# Patient Record
Sex: MALE | Race: BLACK OR AFRICAN AMERICAN | NOT HISPANIC OR LATINO | URBAN - METROPOLITAN AREA
[De-identification: names, ages, dates, MRNs, and addresses within clinical notes are randomized per-mention and may not be internally consistent; named-entity substitution may affect disease eponyms.]

---

## 2016-08-08 RX ORDER — INSULIN GLARGINE 100 [IU]/ML
15 INJECTION, SOLUTION SUBCUTANEOUS
Qty: 0 | Refills: 0 | COMMUNITY
Start: 2016-08-08

## 2017-06-23 ENCOUNTER — INPATIENT (INPATIENT)
Facility: HOSPITAL | Age: 57
LOS: 4 days | Discharge: NURSING, REHAB OR LONG-TERM CARE | DRG: 871 | End: 2017-06-28
Attending: INTERNAL MEDICINE | Admitting: INTERNAL MEDICINE
Payer: MEDICAID

## 2017-06-23 VITALS
WEIGHT: 246.04 LBS | OXYGEN SATURATION: 98 % | SYSTOLIC BLOOD PRESSURE: 131 MMHG | DIASTOLIC BLOOD PRESSURE: 66 MMHG | RESPIRATION RATE: 28 BRPM | HEART RATE: 88 BPM

## 2017-06-23 DIAGNOSIS — J96.00 ACUTE RESPIRATORY FAILURE, UNSPECIFIED WHETHER WITH HYPOXIA OR HYPERCAPNIA: ICD-10-CM

## 2017-06-23 DIAGNOSIS — J18.9 PNEUMONIA, UNSPECIFIED ORGANISM: ICD-10-CM

## 2017-06-23 DIAGNOSIS — N17.9 ACUTE KIDNEY FAILURE, UNSPECIFIED: ICD-10-CM

## 2017-06-23 DIAGNOSIS — M86.9 OSTEOMYELITIS, UNSPECIFIED: ICD-10-CM

## 2017-06-23 DIAGNOSIS — E11.49 TYPE 2 DIABETES MELLITUS WITH OTHER DIABETIC NEUROLOGICAL COMPLICATION: ICD-10-CM

## 2017-06-23 DIAGNOSIS — E11.9 TYPE 2 DIABETES MELLITUS WITHOUT COMPLICATIONS: ICD-10-CM

## 2017-06-23 DIAGNOSIS — E11.40 TYPE 2 DIABETES MELLITUS WITH DIABETIC NEUROPATHY, UNSPECIFIED: ICD-10-CM

## 2017-06-23 DIAGNOSIS — E11.621 TYPE 2 DIABETES MELLITUS WITH FOOT ULCER: ICD-10-CM

## 2017-06-23 DIAGNOSIS — J96.90 RESPIRATORY FAILURE, UNSPECIFIED, UNSPECIFIED WHETHER WITH HYPOXIA OR HYPERCAPNIA: ICD-10-CM

## 2017-06-23 DIAGNOSIS — I10 ESSENTIAL (PRIMARY) HYPERTENSION: ICD-10-CM

## 2017-06-23 DIAGNOSIS — G93.40 ENCEPHALOPATHY, UNSPECIFIED: ICD-10-CM

## 2017-06-23 DIAGNOSIS — I82.409 ACUTE EMBOLISM AND THROMBOSIS OF UNSPECIFIED DEEP VEINS OF UNSPECIFIED LOWER EXTREMITY: ICD-10-CM

## 2017-06-23 DIAGNOSIS — R22.0 LOCALIZED SWELLING, MASS AND LUMP, HEAD: ICD-10-CM

## 2017-06-23 DIAGNOSIS — A41.9 SEPSIS, UNSPECIFIED ORGANISM: ICD-10-CM

## 2017-06-23 DIAGNOSIS — Z29.9 ENCOUNTER FOR PROPHYLACTIC MEASURES, UNSPECIFIED: ICD-10-CM

## 2017-06-23 DIAGNOSIS — Z51.5 ENCOUNTER FOR PALLIATIVE CARE: ICD-10-CM

## 2017-06-23 DIAGNOSIS — E66.9 OBESITY, UNSPECIFIED: ICD-10-CM

## 2017-06-23 DIAGNOSIS — C09.9 MALIGNANT NEOPLASM OF TONSIL, UNSPECIFIED: ICD-10-CM

## 2017-06-23 DIAGNOSIS — N19 UNSPECIFIED KIDNEY FAILURE: ICD-10-CM

## 2017-06-23 DIAGNOSIS — G47.33 OBSTRUCTIVE SLEEP APNEA (ADULT) (PEDIATRIC): ICD-10-CM

## 2017-06-23 LAB
ALBUMIN SERPL ELPH-MCNC: 2.7 G/DL — LOW (ref 3.3–5)
ALP SERPL-CCNC: 195 U/L — HIGH (ref 40–120)
ALT FLD-CCNC: 40 U/L — SIGNIFICANT CHANGE UP (ref 12–78)
ANION GAP SERPL CALC-SCNC: 3 MMOL/L — LOW (ref 5–17)
ANION GAP SERPL CALC-SCNC: 6 MMOL/L — SIGNIFICANT CHANGE UP (ref 5–17)
ANISOCYTOSIS BLD QL: SLIGHT — SIGNIFICANT CHANGE UP
APPEARANCE UR: CLEAR — SIGNIFICANT CHANGE UP
AST SERPL-CCNC: 19 U/L — SIGNIFICANT CHANGE UP (ref 15–37)
BASE EXCESS BLDA CALC-SCNC: 3.1 MMOL/L — HIGH (ref -2–2)
BASO STIPL BLD QL SMEAR: PRESENT — SIGNIFICANT CHANGE UP
BILIRUB SERPL-MCNC: 0.3 MG/DL — SIGNIFICANT CHANGE UP (ref 0.2–1.2)
BILIRUB UR-MCNC: NEGATIVE — SIGNIFICANT CHANGE UP
BLOOD GAS COMMENTS ARTERIAL: SIGNIFICANT CHANGE UP
BLOOD GAS COMMENTS ARTERIAL: SIGNIFICANT CHANGE UP
BUN SERPL-MCNC: 100 MG/DL — HIGH (ref 7–23)
BUN SERPL-MCNC: 112 MG/DL — HIGH (ref 7–23)
CALCIUM SERPL-MCNC: 10.1 MG/DL — SIGNIFICANT CHANGE UP (ref 8.5–10.1)
CALCIUM SERPL-MCNC: 9.4 MG/DL — SIGNIFICANT CHANGE UP (ref 8.5–10.1)
CHLORIDE SERPL-SCNC: 104 MMOL/L — SIGNIFICANT CHANGE UP (ref 96–108)
CHLORIDE SERPL-SCNC: 107 MMOL/L — SIGNIFICANT CHANGE UP (ref 96–108)
CO2 SERPL-SCNC: 29 MMOL/L — SIGNIFICANT CHANGE UP (ref 22–31)
CO2 SERPL-SCNC: 35 MMOL/L — HIGH (ref 22–31)
COLOR SPEC: YELLOW — SIGNIFICANT CHANGE UP
COMMENT - URINE: SIGNIFICANT CHANGE UP
CREAT ?TM UR-MCNC: 84 MG/DL — SIGNIFICANT CHANGE UP
CREAT SERPL-MCNC: 3.5 MG/DL — HIGH (ref 0.5–1.3)
CREAT SERPL-MCNC: 3.9 MG/DL — HIGH (ref 0.5–1.3)
DIFF PNL FLD: NEGATIVE — SIGNIFICANT CHANGE UP
GLUCOSE SERPL-MCNC: 338 MG/DL — HIGH (ref 70–99)
GLUCOSE SERPL-MCNC: 338 MG/DL — HIGH (ref 70–99)
GLUCOSE UR QL: 300 MG/DL
HCO3 BLDA-SCNC: 27 MMOL/L — SIGNIFICANT CHANGE UP (ref 23–27)
HCT VFR BLD CALC: 26.2 % — LOW (ref 39–50)
HGB BLD-MCNC: 8.5 G/DL — LOW (ref 13–17)
HOROWITZ INDEX BLDA+IHG-RTO: 40 — SIGNIFICANT CHANGE UP
INR BLD: 1.23 RATIO — HIGH (ref 0.88–1.16)
KETONES UR-MCNC: NEGATIVE — SIGNIFICANT CHANGE UP
LACTATE SERPL-SCNC: 1.1 MMOL/L — SIGNIFICANT CHANGE UP (ref 0.7–2)
LACTATE SERPL-SCNC: 1.3 MMOL/L — SIGNIFICANT CHANGE UP (ref 0.7–2)
LACTATE SERPL-SCNC: 1.6 MMOL/L — SIGNIFICANT CHANGE UP (ref 0.7–2)
LEUKOCYTE ESTERASE UR-ACNC: NEGATIVE — SIGNIFICANT CHANGE UP
LYMPHOCYTES # BLD AUTO: 5 % — LOW (ref 13–44)
MACROCYTES BLD QL: SLIGHT — SIGNIFICANT CHANGE UP
MAGNESIUM SERPL-MCNC: 2.9 MG/DL — HIGH (ref 1.6–2.6)
MCHC RBC-ENTMCNC: 32.6 GM/DL — SIGNIFICANT CHANGE UP (ref 32–36)
MCHC RBC-ENTMCNC: 34.8 PG — HIGH (ref 27–34)
MCV RBC AUTO: 106.7 FL — HIGH (ref 80–100)
MONOCYTES NFR BLD AUTO: 7 % — SIGNIFICANT CHANGE UP (ref 1–9)
NEUTROPHILS NFR BLD AUTO: 80 % — HIGH (ref 43–77)
NEUTS BAND # BLD: 8 % — SIGNIFICANT CHANGE UP (ref 0–8)
NITRITE UR-MCNC: NEGATIVE — SIGNIFICANT CHANGE UP
PCO2 BLDA: 44 MMHG — SIGNIFICANT CHANGE UP (ref 32–46)
PH BLDA: 7.41 — SIGNIFICANT CHANGE UP (ref 7.35–7.45)
PH UR: 5 — SIGNIFICANT CHANGE UP (ref 5–8)
PHOSPHATE SERPL-MCNC: 3.4 MG/DL — SIGNIFICANT CHANGE UP (ref 2.5–4.5)
PLAT MORPH BLD: NORMAL — SIGNIFICANT CHANGE UP
PLATELET # BLD AUTO: 248 K/UL — SIGNIFICANT CHANGE UP (ref 150–400)
PO2 BLDA: 109 MMHG — HIGH (ref 74–108)
POIKILOCYTOSIS BLD QL AUTO: SLIGHT — SIGNIFICANT CHANGE UP
POTASSIUM SERPL-MCNC: 5.4 MMOL/L — HIGH (ref 3.5–5.3)
POTASSIUM SERPL-MCNC: 5.5 MMOL/L — HIGH (ref 3.5–5.3)
POTASSIUM SERPL-SCNC: 5.4 MMOL/L — HIGH (ref 3.5–5.3)
POTASSIUM SERPL-SCNC: 5.5 MMOL/L — HIGH (ref 3.5–5.3)
PROT SERPL-MCNC: 8 G/DL — SIGNIFICANT CHANGE UP (ref 6–8.3)
PROT UR-MCNC: 500 MG/DL
PROTHROM AB SERPL-ACNC: 13.5 SEC — HIGH (ref 9.8–12.7)
RBC # BLD: 2.45 M/UL — LOW (ref 4.2–5.8)
RBC # FLD: 12.1 % — SIGNIFICANT CHANGE UP (ref 10.3–14.5)
RBC BLD AUTO: ABNORMAL
SAO2 % BLDA: 99 % — HIGH (ref 92–96)
SODIUM SERPL-SCNC: 142 MMOL/L — SIGNIFICANT CHANGE UP (ref 135–145)
SODIUM SERPL-SCNC: 142 MMOL/L — SIGNIFICANT CHANGE UP (ref 135–145)
SODIUM UR-SCNC: <20 MMOL/L — SIGNIFICANT CHANGE UP
SP GR SPEC: 1.01 — SIGNIFICANT CHANGE UP (ref 1.01–1.02)
UROBILINOGEN FLD QL: NEGATIVE — SIGNIFICANT CHANGE UP
UUN UR-MCNC: 696 MG/DL — SIGNIFICANT CHANGE UP
WBC # BLD: 24.5 K/UL — HIGH (ref 3.8–10.5)
WBC # FLD AUTO: 24.5 K/UL — HIGH (ref 3.8–10.5)
WBC UR QL: SIGNIFICANT CHANGE UP

## 2017-06-23 PROCEDURE — 71010: CPT | Mod: 26

## 2017-06-23 PROCEDURE — 99285 EMERGENCY DEPT VISIT HI MDM: CPT | Mod: 25

## 2017-06-23 PROCEDURE — 93010 ELECTROCARDIOGRAM REPORT: CPT

## 2017-06-23 PROCEDURE — 99291 CRITICAL CARE FIRST HOUR: CPT

## 2017-06-23 RX ORDER — HEPARIN SODIUM 5000 [USP'U]/ML
5000 INJECTION INTRAVENOUS; SUBCUTANEOUS EVERY 12 HOURS
Qty: 0 | Refills: 0 | Status: DISCONTINUED | OUTPATIENT
Start: 2017-06-23 | End: 2017-06-24

## 2017-06-23 RX ORDER — DEXTROSE 50 % IN WATER 50 %
1 SYRINGE (ML) INTRAVENOUS ONCE
Qty: 0 | Refills: 0 | Status: DISCONTINUED | OUTPATIENT
Start: 2017-06-23 | End: 2017-06-25

## 2017-06-23 RX ORDER — ASPIRIN/CALCIUM CARB/MAGNESIUM 324 MG
81 TABLET ORAL DAILY
Qty: 0 | Refills: 0 | Status: DISCONTINUED | OUTPATIENT
Start: 2017-06-23 | End: 2017-06-28

## 2017-06-23 RX ORDER — DEXTROSE 50 % IN WATER 50 %
25 SYRINGE (ML) INTRAVENOUS ONCE
Qty: 0 | Refills: 0 | Status: DISCONTINUED | OUTPATIENT
Start: 2017-06-23 | End: 2017-06-25

## 2017-06-23 RX ORDER — ACETAMINOPHEN 500 MG
650 TABLET ORAL ONCE
Qty: 0 | Refills: 0 | Status: COMPLETED | OUTPATIENT
Start: 2017-06-23 | End: 2017-06-23

## 2017-06-23 RX ORDER — FINASTERIDE 5 MG/1
5 TABLET, FILM COATED ORAL DAILY
Qty: 0 | Refills: 0 | Status: DISCONTINUED | OUTPATIENT
Start: 2017-06-23 | End: 2017-06-28

## 2017-06-23 RX ORDER — INSULIN GLARGINE 100 [IU]/ML
20 INJECTION, SOLUTION SUBCUTANEOUS AT BEDTIME
Qty: 0 | Refills: 0 | Status: DISCONTINUED | OUTPATIENT
Start: 2017-06-23 | End: 2017-06-25

## 2017-06-23 RX ORDER — VANCOMYCIN HCL 1 G
1000 VIAL (EA) INTRAVENOUS ONCE
Qty: 0 | Refills: 0 | Status: COMPLETED | OUTPATIENT
Start: 2017-06-23 | End: 2017-06-23

## 2017-06-23 RX ORDER — SODIUM CHLORIDE 9 MG/ML
1000 INJECTION, SOLUTION INTRAVENOUS
Qty: 0 | Refills: 0 | Status: DISCONTINUED | OUTPATIENT
Start: 2017-06-23 | End: 2017-06-24

## 2017-06-23 RX ORDER — SODIUM CHLORIDE 9 MG/ML
1000 INJECTION INTRAMUSCULAR; INTRAVENOUS; SUBCUTANEOUS
Qty: 0 | Refills: 0 | Status: COMPLETED | OUTPATIENT
Start: 2017-06-23 | End: 2017-06-23

## 2017-06-23 RX ORDER — GLUCAGON INJECTION, SOLUTION 0.5 MG/.1ML
1 INJECTION, SOLUTION SUBCUTANEOUS ONCE
Qty: 0 | Refills: 0 | Status: DISCONTINUED | OUTPATIENT
Start: 2017-06-23 | End: 2017-06-23

## 2017-06-23 RX ORDER — DEXTROSE 50 % IN WATER 50 %
1 SYRINGE (ML) INTRAVENOUS ONCE
Qty: 0 | Refills: 0 | Status: DISCONTINUED | OUTPATIENT
Start: 2017-06-23 | End: 2017-06-23

## 2017-06-23 RX ORDER — DEXAMETHASONE 0.5 MG/5ML
6 ELIXIR ORAL ONCE
Qty: 0 | Refills: 0 | Status: COMPLETED | OUTPATIENT
Start: 2017-06-23 | End: 2017-06-23

## 2017-06-23 RX ORDER — PIPERACILLIN AND TAZOBACTAM 4; .5 G/20ML; G/20ML
3.38 INJECTION, POWDER, LYOPHILIZED, FOR SOLUTION INTRAVENOUS EVERY 12 HOURS
Qty: 0 | Refills: 0 | Status: DISCONTINUED | OUTPATIENT
Start: 2017-06-23 | End: 2017-06-24

## 2017-06-23 RX ORDER — DEXTROSE 50 % IN WATER 50 %
12.5 SYRINGE (ML) INTRAVENOUS ONCE
Qty: 0 | Refills: 0 | Status: DISCONTINUED | OUTPATIENT
Start: 2017-06-23 | End: 2017-06-23

## 2017-06-23 RX ORDER — METOPROLOL TARTRATE 50 MG
100 TABLET ORAL EVERY 12 HOURS
Qty: 0 | Refills: 0 | Status: DISCONTINUED | OUTPATIENT
Start: 2017-06-23 | End: 2017-06-28

## 2017-06-23 RX ORDER — DEXTROSE 50 % IN WATER 50 %
25 SYRINGE (ML) INTRAVENOUS ONCE
Qty: 0 | Refills: 0 | Status: DISCONTINUED | OUTPATIENT
Start: 2017-06-23 | End: 2017-06-23

## 2017-06-23 RX ORDER — SODIUM POLYSTYRENE SULFONATE 4.1 MEQ/G
30 POWDER, FOR SUSPENSION ORAL ONCE
Qty: 0 | Refills: 0 | Status: COMPLETED | OUTPATIENT
Start: 2017-06-23 | End: 2017-06-23

## 2017-06-23 RX ORDER — ALBUTEROL 90 UG/1
2 AEROSOL, METERED ORAL EVERY 6 HOURS
Qty: 0 | Refills: 0 | Status: DISCONTINUED | OUTPATIENT
Start: 2017-06-23 | End: 2017-06-28

## 2017-06-23 RX ORDER — PANTOPRAZOLE SODIUM 20 MG/1
40 TABLET, DELAYED RELEASE ORAL DAILY
Qty: 0 | Refills: 0 | Status: DISCONTINUED | OUTPATIENT
Start: 2017-06-23 | End: 2017-06-24

## 2017-06-23 RX ORDER — DEXAMETHASONE 0.5 MG/5ML
4 ELIXIR ORAL EVERY 6 HOURS
Qty: 0 | Refills: 0 | Status: DISCONTINUED | OUTPATIENT
Start: 2017-06-23 | End: 2017-06-24

## 2017-06-23 RX ORDER — INSULIN LISPRO 100/ML
VIAL (ML) SUBCUTANEOUS
Qty: 0 | Refills: 0 | Status: DISCONTINUED | OUTPATIENT
Start: 2017-06-23 | End: 2017-06-23

## 2017-06-23 RX ORDER — SODIUM CHLORIDE 9 MG/ML
1000 INJECTION, SOLUTION INTRAVENOUS
Qty: 0 | Refills: 0 | Status: DISCONTINUED | OUTPATIENT
Start: 2017-06-23 | End: 2017-06-23

## 2017-06-23 RX ORDER — SODIUM CHLORIDE 9 MG/ML
1000 INJECTION, SOLUTION INTRAVENOUS
Qty: 0 | Refills: 0 | Status: DISCONTINUED | OUTPATIENT
Start: 2017-06-23 | End: 2017-06-25

## 2017-06-23 RX ORDER — DEXTROSE 50 % IN WATER 50 %
12.5 SYRINGE (ML) INTRAVENOUS ONCE
Qty: 0 | Refills: 0 | Status: DISCONTINUED | OUTPATIENT
Start: 2017-06-23 | End: 2017-06-25

## 2017-06-23 RX ORDER — PIPERACILLIN AND TAZOBACTAM 4; .5 G/20ML; G/20ML
3.38 INJECTION, POWDER, LYOPHILIZED, FOR SOLUTION INTRAVENOUS ONCE
Qty: 0 | Refills: 0 | Status: COMPLETED | OUTPATIENT
Start: 2017-06-23 | End: 2017-06-23

## 2017-06-23 RX ORDER — IPRATROPIUM/ALBUTEROL SULFATE 18-103MCG
3 AEROSOL WITH ADAPTER (GRAM) INHALATION ONCE
Qty: 0 | Refills: 0 | Status: COMPLETED | OUTPATIENT
Start: 2017-06-23 | End: 2017-06-23

## 2017-06-23 RX ORDER — GLUCAGON INJECTION, SOLUTION 0.5 MG/.1ML
1 INJECTION, SOLUTION SUBCUTANEOUS ONCE
Qty: 0 | Refills: 0 | Status: DISCONTINUED | OUTPATIENT
Start: 2017-06-23 | End: 2017-06-25

## 2017-06-23 RX ORDER — INSULIN LISPRO 100/ML
VIAL (ML) SUBCUTANEOUS EVERY 4 HOURS
Qty: 0 | Refills: 0 | Status: DISCONTINUED | OUTPATIENT
Start: 2017-06-23 | End: 2017-06-25

## 2017-06-23 RX ADMIN — SODIUM POLYSTYRENE SULFONATE 30 GRAM(S): 4.1 POWDER, FOR SUSPENSION ORAL at 03:37

## 2017-06-23 RX ADMIN — INSULIN GLARGINE 20 UNIT(S): 100 INJECTION, SOLUTION SUBCUTANEOUS at 21:19

## 2017-06-23 RX ADMIN — Medication 8: at 21:19

## 2017-06-23 RX ADMIN — SODIUM CHLORIDE 1000 MILLILITER(S): 9 INJECTION INTRAMUSCULAR; INTRAVENOUS; SUBCUTANEOUS at 02:40

## 2017-06-23 RX ADMIN — HEPARIN SODIUM 5000 UNIT(S): 5000 INJECTION INTRAVENOUS; SUBCUTANEOUS at 17:59

## 2017-06-23 RX ADMIN — SODIUM CHLORIDE 1000 MILLILITER(S): 9 INJECTION INTRAMUSCULAR; INTRAVENOUS; SUBCUTANEOUS at 01:54

## 2017-06-23 RX ADMIN — Medication 3 MILLILITER(S): at 03:42

## 2017-06-23 RX ADMIN — Medication 6 MILLIGRAM(S): at 09:21

## 2017-06-23 RX ADMIN — Medication 10: at 12:12

## 2017-06-23 RX ADMIN — Medication 4 MILLIGRAM(S): at 23:09

## 2017-06-23 RX ADMIN — Medication 250 MILLIGRAM(S): at 03:25

## 2017-06-23 RX ADMIN — SODIUM CHLORIDE 100 MILLILITER(S): 9 INJECTION, SOLUTION INTRAVENOUS at 21:24

## 2017-06-23 RX ADMIN — Medication 4 MILLIGRAM(S): at 17:59

## 2017-06-23 RX ADMIN — Medication 650 MILLIGRAM(S): at 02:00

## 2017-06-23 RX ADMIN — FINASTERIDE 5 MILLIGRAM(S): 5 TABLET, FILM COATED ORAL at 17:59

## 2017-06-23 RX ADMIN — PIPERACILLIN AND TAZOBACTAM 25 GRAM(S): 4; .5 INJECTION, POWDER, LYOPHILIZED, FOR SOLUTION INTRAVENOUS at 17:59

## 2017-06-23 RX ADMIN — Medication 81 MILLIGRAM(S): at 12:13

## 2017-06-23 RX ADMIN — Medication 8: at 17:58

## 2017-06-23 RX ADMIN — PANTOPRAZOLE SODIUM 40 MILLIGRAM(S): 20 TABLET, DELAYED RELEASE ORAL at 18:05

## 2017-06-23 RX ADMIN — PIPERACILLIN AND TAZOBACTAM 200 GRAM(S): 4; .5 INJECTION, POWDER, LYOPHILIZED, FOR SOLUTION INTRAVENOUS at 02:00

## 2017-06-23 NOTE — PROGRESS NOTE ADULT - SUBJECTIVE AND OBJECTIVE BOX
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 Upon initial evaluation, noted to have leukocytosis, worsening renal function with hyperkalemia, and CXR concerning for pneumonia, admitted for sepsis workup. Also with audible upper airway sounds, concerning for upper airway obstruction. Per HCP, pt has had decline in mental status over the past 1-2 weeks. In 2017, had negative noncontrast CT head, CT neck revealed recurrence of CA.     Events last 24 hours: Remains on BiPAP. Eyes open, withdraws UE to noxious stimuli, nonverbal and does not follow commands. EGD revealed severe edema of epiglottis, upper airway, and larynx. Subglottic region and trachea patent. Case discussed with ID- recommends to continue Zosyn for possible aspiration pna at this time. DNR / DNI.    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      Medications:  piperacillin/tazobactam IVPB. 3.375Gram(s) IV Intermittent every 12 hours  metoprolol 100milliGRAM(s) Oral every 12 hours  ALBUTerol    90 MICROgram(s) HFA Inhaler 2Puff(s) Inhalation every 6 hours PRN  heparin  Injectable 5000Unit(s) SubCutaneous every 12 hours  aspirin enteric coated 81milliGRAM(s) Oral daily  pantoprazole  Injectable 40milliGRAM(s) IV Push daily  finasteride 5milliGRAM(s) Oral daily  dexamethasone  Injectable 4milliGRAM(s) IV Push every 6 hours  insulin glargine Injectable (LANTUS) 20Unit(s) SubCutaneous at bedtime  insulin lispro (HumaLOG) corrective regimen sliding scale  SubCutaneous every 4 hours  dextrose Gel 1Dose(s) Oral once PRN  dextrose 50% Injectable 12.5Gram(s) IV Push once  dextrose 50% Injectable 25Gram(s) IV Push once  dextrose 50% Injectable 25Gram(s) IV Push once  glucagon  Injectable 1milliGRAM(s) IntraMuscular once PRN  sodium chloride 0.45%. 1000milliLiter(s) IV Continuous <Continuous>  dextrose 5%. 1000milliLiter(s) IV Continuous <Continuous>      ICU Vital Signs Last 24 Hrs  T(C): 37.2, Max: 37.9 ( @ 05:36)  T(F): 99, Max: 100.2 ( @ 05:36)  HR: 96 (86 - 106)  BP: 149/75 (101/57 - 198/93)  BP(mean): 105 (75 - 133)  ABP: --  ABP(mean): --  RR: 14 (14 - 30)  SpO2: 100% (90% - 100%)      ABG - ( 2017 12:43 )  pH: 7.41  /  pCO2: 44    /  pO2: 109   / HCO3: 27    / Base Excess: 3.1   /  SaO2: 99        I&O's Detail    I & Os for current day (as of 2017 22:19)  =============================================  IN:    sodium chloride 0.45%.: 1200 ml    Oral Fluid: 120 ml    Solution: 100 ml    Total IN: 1420 ml  ---------------------------------------------  OUT:    Indwelling Catheter - Urethral: 1160 ml    Total OUT: 1160 ml  ---------------------------------------------  Total NET: 260 ml        LABS:                        8.0    25.0  )-----------( 197      ( 2017 15:51 )             24.4         142  |  107  |  100<H>  ----------------------------<  338<H>  5.4<H>   |  29  |  3.50<H>    Ca    9.4      2017 13:57  Phos  3.4       Mg     2.9         TPro  8.0  /  Alb  2.7<L>  /  TBili  0.3  /  DBili  x   /  AST  19  /  ALT  40  /  AlkPhos  195<H>        CAPILLARY BLOOD GLUCOSE  319 (2017 22:00)    PT/INR - ( 2017 01:41 )   PT: 13.5 sec;   INR: 1.23 ratio        Urinalysis Basic - ( 2017 01:56 )    Color: Yellow / Appearance: Clear / S.015 / pH: x  Gluc: x / Ketone: Negative  / Bili: Negative / Urobili: Negative   Blood: x / Protein: 500 mg/dL / Nitrite: Negative   Leuk Esterase: Negative / RBC: x / WBC 0-2   Sq Epi: x / Non Sq Epi: x / Bacteria: x      CULTURES:      Physical Examination:    General: Obese AA male, lying in bed awake. No acute distress.      HEENT: Eyes open, pupils equal, reactive to light and symmetric.    PULM: On BiPAP. No accessory muscle use or respiratory distress. Course breath sounds throughout bilaterally, no significant sputum production    CVS: Regular rate and rhythm, +S1,S2, no murmurs, rubs, or gallops    ABD: Soft, nondistended, normoactive bowel sounds, no masses    EXT: Chronic skin and nail changes    SKIN: Warm, no rashes noted.    NEURO: Awake, no purposeful movements, does not follow commands, nonverbal    RADIOLOGY: EXAM:  PORTABLE CHEST URGENT                          PROCEDURE DATE:  2017      INTERPRETATION:  EXAM: Portable chest x-ray.    CLINICAL INFORMATION: Fever.    FINDINGS: Artifacts limit the exam. Left hemidiaphragm is obscured and   there is some increased density noted in the retrocardiac region,   concerning for left lower lobe infiltrate. Clinical correlation is   advised. Remainder of the study appears similar to prior exam of 2016.    IMPRESSION: Findings as above.    VERONIKA MOORE M.D., ATTENDING RADIOLOGIST  This document has been electronically signed. 2017 10:14AM    CRITICAL CARE TIME SPENT: I have spent 38 minutes of critical care time evaluating and treating this patient, including reviewing charts, labs, imagining studies, and collaborating with interdisciplinary team. HCP made aware of findings by ENT. Discussed goals of care with HCP and addressed concerns for worsening of mental status and respiratory status; given degree of swelling obstructing the airway that may preclude ET intubation, pt likely may ultimately require trach. HCP verbalized that patient made wishes clear that he does not want tracheostomy, and HCP has agreed to DNR/DNI.

## 2017-06-23 NOTE — CONSULT NOTE ADULT - PROBLEM SELECTOR PROBLEM 1
CARMEL (acute kidney injury)
Encephalopathy, unspecified
Pneumonia due to infectious organism, unspecified laterality, unspecified part of lung

## 2017-06-23 NOTE — PROGRESS NOTE ADULT - PROBLEM SELECTOR PLAN 7
Unable to take PO meds at this time given his current mental status  Will treat with IV push meds as needed for now Continue metoprolol

## 2017-06-23 NOTE — PATIENT PROFILE ADULT. - PRO SERVICES AT [facility]
Airway  Urgency: elective    Date/Time: 3/8/2017 3:09 PM  Airway not difficult    General Information and Staff    Patient location during procedure: OR  CRNA: KEVIN ABBOTT    Indications and Patient Condition  Indications for airway management: airway protection    Preoxygenated: yes  Mask difficulty assessment: 1 - vent by mask    Final Airway Details  Final airway type: supraglottic airway      Successful airway: classic  Size 4    Number of attempts at approach: 1    Additional Comments  LMA placed without difficulty, ventilation with assist, equal breath sounds and symmetric chest rise and fall             none

## 2017-06-23 NOTE — H&P ADULT - PROBLEM SELECTOR PLAN 2
Admitted for septic workup and evaluation,send blood and urine cx,serial lactate levels,monitor vitals closley,ivfs hydration,monitor urine output and renal profile,iv abx initiated

## 2017-06-23 NOTE — CONSULT NOTE ADULT - PROBLEM SELECTOR RECOMMENDATION 3
ON COVERAGE MEDS
DIABETES:  not Good control, continue current therapy.  [Control is    poor ].   Encourage weight loss.  [ Regular exercise program. ] [ Referral for dietary counseling. ]

## 2017-06-23 NOTE — CONSULT NOTE ADULT - SUBJECTIVE AND OBJECTIVE BOX
Patient unknown to me with hx of tonsil carcinoma. Patient unable to give hx. hx obtained from care giver and health care proxy. In 2016 patient a smoker waying over 400 pounds was diagnosed with tonsil cancer on right. No surgery. Treated with radiation and chemotherapy. Unable to complete radiation secondary to complications. Was told might need tracheostomy and patient refused at that time. Subsequently admitted to nursing home. Hx of diabetes. weight loss from over 400 to 250. Had several possible neurological episodes while undergoing pt in nursing home. Insulin stopped and blood sugar found to be stable at 150. Had intermittent mental status changes and confusion and in May of 2017 approx 5 weeks ago underwent ct scan of brain and neck. Head ct normal. Neck ct diffuse swelling with obvious tumor reoccurrence of significant lymph nodes. was given steroids for difficulty breathing and a swelling appeared right neck. Has never had PET scan or MRI. Last 2 days unresponsive to anything but pain. Having respiratory difficulty and was placed on cpap which seems to be helping here in ICU.. Had hx of sleep apnea but was not given cpap in nursing home. was given oxygen instead. blood sugar in hospital approx 400. Insulin restarted    On physical exam patient unresponsive, on cpap. Nasal clear. Oral filled with thick secretions. suctioned clear and oral cavity examined. no obvious tonsil cancer or localized disease. significant edema and soft tissue swelling noted. no distinct lesion. Flex laryngoscopy through left nares reveals severe edema of upper airway and larynx. subglottic region and trachea patent.  Pooling noted post cricoid, pyriforms and valleculae. epiglottis edematous. unable to assess cord motion but appears to be open as able to pass flexible scope through cords to trachea.   CPAP reapplied and oral care given by nurses after examination.

## 2017-06-23 NOTE — PROGRESS NOTE ADULT - PROBLEM SELECTOR PLAN 1
Continue NIV  Repeat ABG in AM  Will titrate settings to maintain SaO2 >92%, or pH >7.25.   Aggressive chest PT and suctioning  Continue nebs, decadron  Aspiration precautions, HOB >30 degrees  NPO

## 2017-06-23 NOTE — CONSULT NOTE ADULT - SUBJECTIVE AND OBJECTIVE BOX
Patient is a 57y old  Male who presents with a chief complaint of fever,sob (23 Jun 2017 07:52)      HPI:  56 yo AAM ,[facility] resident with hx of DM,OBESITY,JYOTI,HTN,OM,DVT,R heel ulcer,PVD,R tonsillar ca ,brought to ER with fever 101.8 and sob ,he  was diagnosed with acute respiratory failure and found to have stridor and upper airway obstuction ,admitted to ICU ,placed on BIPAP and ENT eval requested 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 He did not undergo any surgery as he was a poor candidate. Thereafter, he was sent to a rehab facility where his status worsened overtime,he became LTC resident and was refusing tracheostomy which was offered few times  For the last few months, his mental and functional status has deteriorated He lost 150 lbs( his weight was 400 lbs when he was diagnosed with ca)  He has not been very responsive for about 10 days . He had a CT neck in May 2017 which showed severe airway obstruction. Patient was told he  needs a tracheostomy since he was diagnosed with SCC last August  He was started on augmentin recently by Dr Kay due to prorbale pneumonia and transferred to ER this morning due to fever and dyspnea Patient is not able to provide any hx in ER due to unresposive state and respiratory distress Admitted to ICU Palliative care consult requested ,to discuss advance directives and complete MOLST ,HCP requests everyhtning to be done ,but aware of patient's wishes not to have tracheostomy and in agreement with his decision Patient also found to be in ARF and to be hyperglycemic (23 Jun 2017 07:52)      Asked to see patient for ID Consult    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  Osteomyelitis of ankle or foot  Diabetic retinopathy associated with type 2 diabetes mellitus: Insulin  Lumbago  Neuropathy in diabetes  Tobacco dependence  Cellulitis  JYOTI on CPAP  Hypertension  Obesity  DVT (deep venous thrombosis)  Diabetes mellitus  No significant past surgical history      Allergies    No Known Allergies    Intolerances        REVIEW OF SYSTEMS:  All systems below were reviewed and are negative [  ]  HEENT:  ID:  Pulmonary:  Cardiac:  GI:  Renal:  Musculoskeletal:  All other systems above were reviewed and are negative   [  ]    HOME MEDICATIONS:    MEDICATIONS  (STANDING):  heparin  Injectable 5000Unit(s) SubCutaneous every 12 hours  piperacillin/tazobactam IVPB. 3.375Gram(s) IV Intermittent every 12 hours  sodium chloride 0.45%. 1000milliLiter(s) IV Continuous <Continuous>  finasteride 5milliGRAM(s) Oral daily  aspirin enteric coated 81milliGRAM(s) Oral daily  metoprolol 100milliGRAM(s) Oral every 12 hours  dexamethasone  Injectable 4milliGRAM(s) IV Push every 6 hours  insulin glargine Injectable (LANTUS) 20Unit(s) SubCutaneous at bedtime  insulin lispro (HumaLOG) corrective regimen sliding scale  SubCutaneous every 4 hours  dextrose 5%. 1000milliLiter(s) IV Continuous <Continuous>  dextrose 50% Injectable 12.5Gram(s) IV Push once  dextrose 50% Injectable 25Gram(s) IV Push once  dextrose 50% Injectable 25Gram(s) IV Push once  pantoprazole  Injectable 40milliGRAM(s) IV Push daily    MEDICATIONS  (PRN):  ALBUTerol    90 MICROgram(s) HFA Inhaler 2Puff(s) Inhalation every 6 hours PRN Shortness of Breath and/or Wheezing  dextrose Gel 1Dose(s) Oral once PRN Blood Glucose LESS THAN 70 milliGRAM(s)/deciliter  glucagon  Injectable 1milliGRAM(s) IntraMuscular once PRN Glucose LESS THAN 70 milligrams/deciliter      Vital Signs Last 24 Hrs  T(C): 37.2, Max: 37.9 (06-23 @ 05:36)  T(F): 99, Max: 100.2 (06-23 @ 05:36)  HR: 86 (86 - 106)  BP: 132/71 (101/57 - 198/93)  BP(mean): 95 (75 - 133)  RR: 14 (14 - 30)  SpO2: 100% (90% - 100%)    PHYSICAL EXAM:  HEENT:  Neck:  Lungs:  Heart:  Abdomen:  Genital/ Rectal:  Extremities:  Neurologic:  Vascular:    I&O's Summary    I & Os for current day (as of 23 Jun 2017 21:51)  =============================================  IN: 1420 ml / OUT: 1030 ml / NET: 390 ml      LABORATORY:                          8.0    25.0  )-----------( 197      ( 23 Jun 2017 15:51 )             24.4           06-23    142  |  107  |  100<H>  ----------------------------<  338<H>  5.4<H>   |  29  |  3.50<H>    Ca    9.4      23 Jun 2017 13:57  Phos  3.4     06-23  Mg     2.9     06-23    TPro  8.0  /  Alb  2.7<L>  /  TBili  0.3  /  DBili  x   /  AST  19  /  ALT  40  /  AlkPhos  195<H>  06-23          LABORATORY:    CBC Full  -  ( 23 Jun 2017 15:51 )  WBC Count : 25.0 K/uL  Hemoglobin : 8.0 g/dL  Hematocrit : 24.4 %  Platelet Count - Automated : 197 K/uL  Mean Cell Volume : 106.8 fl  Mean Cell Hemoglobin : 34.9 pg  Mean Cell Hemoglobin Concentration : 32.7 gm/dL  Auto Neutrophil # : x  Auto Lymphocyte # : x  Auto Monocyte # : x  Auto Eosinophil # : x  Auto Basophil # : x  Auto Neutrophil % : 87.0 %  Auto Lymphocyte % : 7.0 %  Auto Monocyte % : 2.0 %  Auto Eosinophil % : x  Auto Basophil % : x          06-23    142  |  107  |  100<H>  ----------------------------<  338<H>  5.4<H>   |  29  |  3.50<H>    Ca    9.4      23 Jun 2017 13:57  Phos  3.4     06-23  Mg     2.9     06-23    TPro  8.0  /  Alb  2.7<L>  /  TBili  0.3  /  DBili  x   /  AST  19  /  ALT  40  /  AlkPhos  195<H>  06-23 The patient is a 57 year old male with a history of SCC of the right tonsil with metastasis to the R neck treated with chemotherapy and radiation who was brought to the ED for acute respiratory failure and found to have stridor and upper airway obstruction. The patient was admitted to ICU and placed on BIBAP. He was on Augmentin at the rehab recently for pneumonia. The patient was advised to have tracheostomy done in the past due to the increased swelling in his neck but he refused. He was given one dose of IV Vancomycin and is on IV Zosyn now.        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    HOME MEDICATIONS:    MEDICATIONS  (STANDING):  heparin  Injectable 5000Unit(s) SubCutaneous every 12 hours  piperacillin/tazobactam IVPB. 3.375Gram(s) IV Intermittent every 12 hours  sodium chloride 0.45%. 1000milliLiter(s) IV Continuous <Continuous>  finasteride 5milliGRAM(s) Oral daily  aspirin enteric coated 81milliGRAM(s) Oral daily  metoprolol 100milliGRAM(s) Oral every 12 hours  dexamethasone  Injectable 4milliGRAM(s) IV Push every 6 hours  insulin glargine Injectable (LANTUS) 20Unit(s) SubCutaneous at bedtime  insulin lispro (HumaLOG) corrective regimen sliding scale  SubCutaneous every 4 hours  dextrose 5%. 1000milliLiter(s) IV Continuous <Continuous>  dextrose 50% Injectable 12.5Gram(s) IV Push once  dextrose 50% Injectable 25Gram(s) IV Push once  dextrose 50% Injectable 25Gram(s) IV Push once  pantoprazole  Injectable 40milliGRAM(s) IV Push daily    MEDICATIONS  (PRN):  ALBUTerol    90 MICROgram(s) HFA Inhaler 2Puff(s) Inhalation every 6 hours PRN Shortness of Breath and/or Wheezing  dextrose Gel 1Dose(s) Oral once PRN Blood Glucose LESS THAN 70 milliGRAM(s)/deciliter  glucagon  Injectable 1milliGRAM(s) IntraMuscular once PRN Glucose LESS THAN 70 milligrams/deciliter      Vital Signs Last 24 Hrs  T(C): 37.2, Max: 37.9 (06-23 @ 05:36)  T(F): 99, Max: 100.2 (06-23 @ 05:36)  HR: 86 (86 - 106)  BP: 132/71 (101/57 - 198/93)  BP(mean): 95 (75 - 133)  RR: 14 (14 - 30)  SpO2: 100% (90% - 100%)    PHYSICAL EXAM:      HEENT: On BIBAP.  Neck: Large swelling.  Lungs: Coarse BS bilaterally; no wheezing.  Heart: RRR; no murmurs.   Abdomen: Soft; no masses.   Extremities: No ulcers.    I&O's Summary    I & Os for current day (as of 23 Jun 2017 21:51)  =============================================  IN: 1420 ml / OUT: 1030 ml / NET: 390 ml      LABORATORY:                          8.0    25.0  )-----------( 197      ( 23 Jun 2017 15:51 )             24.4           06-23    142  |  107  |  100<H>  ----------------------------<  338<H>  5.4<H>   |  29  |  3.50<H>    Ca    9.4      23 Jun 2017 13:57  Phos  3.4     06-23  Mg     2.9     06-23    TPro  8.0  /  Alb  2.7<L>  /  TBili  0.3  /  DBili  x   /  AST  19  /  ALT  40  /  AlkPhos  195<H>  06-23          LABORATORY:    CBC Full  -  ( 23 Jun 2017 15:51 )  WBC Count : 25.0 K/uL  Hemoglobin : 8.0 g/dL  Hematocrit : 24.4 %  Platelet Count - Automated : 197 K/uL  Mean Cell Volume : 106.8 fl  Mean Cell Hemoglobin : 34.9 pg  Mean Cell Hemoglobin Concentration : 32.7 gm/dL  Auto Neutrophil # : x  Auto Lymphocyte # : x  Auto Monocyte # : x  Auto Eosinophil # : x  Auto Basophil # : x  Auto Neutrophil % : 87.0 %  Auto Lymphocyte % : 7.0 %  Auto Monocyte % : 2.0 %  Auto Eosinophil % : x  Auto Basophil % : x          06-23    142  |  107  |  100<H>  ----------------------------<  338<H>  5.4<H>   |  29  |  3.50<H>    Ca    9.4      23 Jun 2017 13:57  Phos  3.4     06-23  Mg     2.9     06-23    TPro  8.0  /  Alb  2.7<L>  /  TBili  0.3  /  DBili  x   /  AST  19  /  ALT  40  /  AlkPhos  195<H>  06-23      Assessment and Plan:    1. Respiratory failure.  2. Suspected aspiration pneumonia.  3. SCC of R tonsil with metastasis to R neck.    . Agree with IV Zosyn for suspected aspiration pneumonia.  . ENT evaluation for neck mass and airway obstruction.  . Continue BIBAP.  . Supportive care.      Thank you for the courtesy of this consultation.

## 2017-06-23 NOTE — ED ADULT NURSE REASSESSMENT NOTE - NS ED NURSE REASSESS COMMENT FT1
patient seen and evaluated by ICu attending. Placed on CPAP. Upgraded to ICU  RN Evelyn linares
patient barely responding, oral mucosa dry, Dr. Martinez made aware, humidified O2 started,   Dr Hanley at bedside , ABG performed as ordered; to be evaluated by ICU

## 2017-06-23 NOTE — PROGRESS NOTE ADULT - ASSESSMENT
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 Also noted to have worsening CARMEL on CKD with uptrending BUN/Cr noted on outpatient labs over past several months, with hyperkalemia and hyperglycemia. Etiology of progressive encephalopathy unclear at this time, brain mets vs metabolic causes considered in differential.

## 2017-06-23 NOTE — CONSULT NOTE ADULT - SUBJECTIVE AND OBJECTIVE BOX
Patient seen and examined today Plan discussed/Questions answered  (with patient/ancillary staff/colleagues) Chart notes reviewd More detailed Pulm/Critical Care notes, assessment and plan  will be added later today as needed after reviewing further labs as they become available     Notable interval events reviewed    ROS/PE  REVIEW OF SYSTEMS Patient is unable to give any reliable review of systems   PHYSICAL EXAM  thick neck Uable to speak Has inspiratory stridor   HEENT Unremarkable PERRLA atraumatic  RESP Fair air entry EXP prolonged    Harsh breath sound Resp distres mild  CARDIAC S1 S2 No S3     NO JVD   Awake Alert and ORIENTED x on  ABDOMEN SOFT BS PRESENT NOT DISTENDED No hepatosplenomegaly  PEDAL EDEMA present No calf tenderness  NO rash GENERAL Not TOXIC looking  . Patient seen and examined today Plan discussed/Questions answered  (with patient/ancillary staff/colleagues) Chart notes reviewd More detailed Pulm/Critical Care notes, assessment and plan  will be added later today as needed after reviewing further labs as they become available     Notable interval events reviewed HCP Erika   57 m ho throat cancer transferred early am 6/23 from [facility] with fever T 101 resp distress In ER noted to have insp stridor ABG on 100% (9a) 734/54/92  [facility] Albuterol neb norvasc 5 asa 81 cyclobenzaprine 5.2 doxazosin 5 lexapro 10 Finasteride 5 Lasix 40 hydralazine 75.3 metoprolol 50.2 renvela .8x3 senna spironolactone 25 miralax D3 2000 6/23/2017 W 24.5 Hb 8.5 Plt 248  Na 142 K 5.5 CO2 35 Cr 3.9 G 338 Alb 2.7  AST 19 ALT 40   6/23LA 1.6-1.1  6/22/2017 CXR lll infiltr     ROS/PE  REVIEW OF SYSTEMS Patient is unable to give any reliable review of systems   PHYSICAL EXAM  thick neck Uable to speak Has inspiratory stridor   HEENT Unremarkable PERRLA atraumatic  RESP Fair air entry EXP prolonged    Harsh breath sound Resp distres mild  CARDIAC S1 S2 No S3     NO JVD   Awake Alert and ORIENTED x on  ABDOMEN SOFT BS PRESENT NOT DISTENDED No hepatosplenomegaly  PEDAL EDEMA present No calf tenderness  NO rash GENERAL Not TOXIC looking  ASSESSMENT PLAN   Case dw Dr Finley 6/23 930 a Pt will be admitted to ICU   HYPERGLYCEMIA  CARMEL   UAO  RESP     . Patient seen and examined today Plan discussed/Questions answered  (with patient/ancillary staff/colleagues) Chart notes reviewd More detailed Pulm/Critical Care notes, assessment and plan  will be added later today as needed after reviewing further labs as they become available     Notable interval events reviewed HCP Erika   57 m ho throat cancer transferred early am 6/23 from [facility] with fever T 101 resp distress In ER noted to have insp stridor ABG on 100% (9a) 734/54/92  [facility] Albuterol neb norvasc 5 asa 81 cyclobenzaprine 5.2 doxazosin 5 lexapro 10 Finasteride 5 Lasix 40 hydralazine 75.3 metoprolol 50.2 renvela .8x3 senna spironolactone 25 miralax D3 2000 6/23/2017 W 24.5 Hb 8.5 Plt 248  Na 142 K 5.5 CO2 35 Cr 3.9 G 338 Alb 2.7  AST 19 ALT 40   6/23LA 1.6-1.1  6/22/2017 CXR lll infiltr     ROS/PE  REVIEW OF SYSTEMS Patient is unable to give any reliable review of systems   PHYSICAL EXAM  thick neck Uable to speak Has inspiratory stridor   HEENT Unremarkable PERRLA atraumatic  RESP Fair air entry EXP prolonged    Harsh breath sound Resp distres mild  CARDIAC S1 S2 No S3     NO JVD   Awake Alert and ORIENTED x on  ABDOMEN SOFT BS PRESENT NOT DISTENDED No hepatosplenomegaly  PEDAL EDEMA present No calf tenderness  NO rash GENERAL Not TOXIC looking  ASSESSMENT PLAN   Case dw Dr Finley 6/23 930 a Pt will be admitted to ICU     . 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  (His Hgb baseline is about 9) lymphedema chronic right heel ulcer (chronic right foot ulcer with chronic drainage tx by Dr Pham in the wound care center), HTN (In 8/2016 His TTE showed LVEF 60%. Grossly normal right ventricular size and function. Moderate LAE), JYOTI (on CPAP  14), PVD (8/2016 US arterial and venous doppler of his bilateral lower extremities which showed significant stenosis between his superficial femoral and popliteal arteries bilaterally but no evidence of DVTs.) smoker who presents from [facility] with resp distress inspiratory stridor and was admitted to [facility] ICU 6/23/2017  [facility] Albuterol neb norvasc 5 asa 81 cyclobenzaprine 5.2 doxazosin 5 lexapro 10 Finasteride 5 Lasix 40 hydralazine 75.3 metoprolol 50.2 renvela .8x3 senna spironolactone 25 miralax D3 2000   Past Medical History:  Cellulitis    Diabetes mellitus    Diabetic retinopathy associated with type 2 diabetes mellitus  Insulin  DVT (deep venous thrombosis)    Hypertension    Lumbago    Neuropathy in diabetes    Obesity    JYOTI on CPAP    Osteomyelitis of ankle or foot    Tobacco dependence.  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  JYOTI (on CPAP  14), PVD smoker who presented from [facility] with resp distress and fever T 101 In ER noted to have insp stridor ABG on 100% (9a) 734/54/92 and was admitted to [facility] ICU 6/23/2017  VITALS/LABS  6/23/2017 W 24.5 Hb 8.5 Plt 248  Na 142 K 5.5 CO2 35 Cr 3.9 G 338 Alb 2.7  AST 19 ALT 40   6/23 LA 1.6-1.1  6/22/2017 CXR lll infiltr

## 2017-06-23 NOTE — CONSULT NOTE ADULT - SUBJECTIVE AND OBJECTIVE BOX
History and Physical:   Source of Information	Chart(s), Home Health Aide or Other Non-Family Caregiver, Other Healthcare Facility	  Outpatient Providers		    Language:  · Patient/Family of Limited English Proficiency	No	      History of Present Illness:  Chief Complaint/Reason for Admission: fever,sob  Podiatry consulted for right heel ulcer.	      Allergies and Intolerances:        Allergies:  	No Known Allergies:     Home Medications:   * Patient Currently Takes Medications as of 09-Aug-2016 16:59 documented in Order   · 	gentamicin 0.1% topical cream: 1 application topically once a day to right heel ulcer  · 	nicotine 10 mg inhalation device: 1  inhaled every 4 hours, As Needed  · 	insulin lispro 100 units/mL subcutaneous solution:  subcutaneous 3 times a day (before meals), 2 Unit(s) if Glucose 151 - 200  	4 Unit(s) if Glucose 201 - 250  	6 Unit(s) if Glucose 251 - 300  	8 Unit(s) if Glucose 301 - 350  	10 Unit(s) if Glucose 351 - 400  	12 Unit(s) if Glucose Greater Than 400  · 	insulin glargine: 50 unit(s) subcutaneous once a day (at bedtime)  · 	heparin: 5000 unit(s) subcutaneous every 8 hours  · 	torsemide 20 mg oral tablet: 1 tab(s) orally once a day  · 	insulin lispro 100 units/mL subcutaneous solution: 5 unit(s) subcutaneous 3 times a day (before meals)  · 	acetaminophen 325 mg oral tablet: 2 tab(s) orally every 6 hours, As needed, For Temp greater than 38 C (100.4 F)  · 	albuterol CFC free 90 mcg/inh inhalation aerosol: 2 puff(s) inhaled every 6 hours, As needed, Shortness of Breath and/or Wheezing  · 	ascorbic acid 500 mg oral tablet: 1 tab(s) orally once a day  · 	aspirin 81 mg oral delayed release tablet: 1 tab(s) orally once a day  · 	cholecalciferol oral tablet: 2000 unit(s) orally once a day  · 	finasteride 5 mg oral tablet: 1 tab(s) orally once a day  · 	gabapentin 400 mg oral capsule: 2 cap(s) orally 2 times a day  · 	metoprolol tartrate 100 mg oral tablet: 1 tab(s) orally every 12 hours  · 	pantoprazole 40 mg oral delayed release tablet: 1 tab(s) orally once a day (before a meal)  · 	zinc sulfate 220 mg oral capsule: 1 cap(s) orally once a day  · 	hydrALAZINE 50 mg oral tablet: 1 tab(s) orally every 8 hours  · 	ferrous sulfate 325 mg (65 mg elemental iron) oral delayed release tablet: 1 tab(s) orally once a day    Patient History:   Past Medical History:  Cellulitis    Diabetes mellitus    Diabetic retinopathy associated with type 2 diabetes mellitus  Insulin  DVT (deep venous thrombosis)    Heel ulcer due to DM    Hypertension    Lumbago    Neuropathy in diabetes    Obesity    JYOTI on CPAP    Osteomyelitis of ankle or foot    Squamous cell carcinoma of tonsil  metastatic to right neck  Tobacco dependence.    Past Surgical History:  No significant past surgical history.    Family History:  <<-----Click on this checkbox to enter Family History . Family history of diabetes mellitus (DM)     Grandparent  Still living? Unknown  Family history of hypertension, Age at diagnosis: Age Unknown     Uncle  Still living? Unknown  Family history of hypertension, Age at diagnosis: Age Unknown  Family history of acute myocardial infarction, Age at diagnosis: Age Unknown  Family history of embolic stroke, Age at diagnosis: Age Unknown.      Lower extremity focus exam:  Vasc: Pedal pulses PT/DP non palpabl b/l. Skin temperature is cool to cool from proximal to distal.  Derm: nonblanchable erythema noted to the right heel. No open wounds no drainage.   Neuro: deferred  Musc:  deferred

## 2017-06-23 NOTE — ED PROVIDER NOTE - PMH
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

## 2017-06-23 NOTE — H&P ADULT - HISTORY OF PRESENT ILLNESS
He was diagnosed with 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 He did not undergo any surgery as he was a poor candidate. Thereafter, he was sent to a rehab facility where his status worsened overtime. For the last few months, his mental and functional status has deteriorated. He has not been very responsive for 1.5 weeks. He had a CT neck in May 2017 which showed severe airway obstruction. Patient was told he may need a tracheostomy when he was diagnosed with SCC last August however he had been adamantly refusing it. 58 yo AAM ,[facility] SNF resident with hx of DM,OBESITY,JYOTI,HTN,OM,DVT,R heel ulcer,PVD,R tonsillar ca ,brought to ER with fever 101.8 and sob ,he  was diagnosed with acute respiratory failure and found to have stridor and upper airway obstuction ,admitted to ICU ,placed on BIPAP and ENT eval requested 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 He did not undergo any surgery as he was a poor candidate. Thereafter, he was sent to a rehab facility where his status worsened overtime,he became LTC resident and was refusing tracheostomy which was offered few times  For the last few months, his mental and functional status has deteriorated He lost 150 lbs( his weight was 400 lbs when he was diagnosed with ca)  He has not been very responsive for about 10 days . He had a CT neck in May 2017 which showed severe airway obstruction. Patient was told he  needs a tracheostomy since he was diagnosed with SCC last August  He was started on augmentin recently by Dr Kay due to prorbale pneumonia and transferred to ER this morning due to fever and dyspnea Patient is not able to provide any hx in ER due to unresposive state and respiratory distress Admitted to ICU Palliative care consult requested ,to discuss advance directives and complete MOLST ,HCP requests everyhtning to be done ,but aware of patient's wishes not to have tracheostomy and in agreement with his decision Patient also found to be in ARF and to be hyperglycemic

## 2017-06-23 NOTE — ED PROVIDER NOTE - OBJECTIVE STATEMENT
57 male presents to ER by ambulance from [facility] with report of fever, lethargy, r/o sepsis. Patient provides no history, details obtanied from chart and wife.

## 2017-06-23 NOTE — ED ADULT NURSE NOTE - OBJECTIVE STATEMENT
Pt to ED via EMS c/c fever from NH. Pt has dementia, wife accompanying. Pt is noted with chronic cellulitic changes to b/l lower extremities, stridorous respirations noted .

## 2017-06-23 NOTE — H&P ADULT - ASSESSMENT
58 yo AAM ,[facility] SNF resident with hx of DM,OBESITY,JYOTI,HTN,OM,DVT,R heel ulcer,PVD,R tonsillar ca ,brought to ER with fever 101.8 and sob ,he  was diagnosed with acute respiratory failure and found to have stridor and upper airway obstuction ,admitted to ICU ,placed on BIPAP and ENT eval requested 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 He did not undergo any surgery as he was a poor candidate. Thereafter, he was sent to a rehab facility where his status worsened overtime,he became LTC resident and was refusing tracheostomy which was offered few times  For the last few months, his mental and functional status has deteriorated He lost 150 lbs( his weight was 400 lbs when he was diagnosed with ca)  He has not been very responsive for about 10 days . He had a CT neck in May 2017 which showed severe airway obstruction. Patient was told he  needs a tracheostomy since he was diagnosed with SCC last August  He was started on augmentin recently by Dr Kay due to prorbale pneumonia and transferred to ER this morning due to fever and dyspnea Patient is not able to provide any hx in ER due to unresposive state and respiratory distress Admitted to ICU Palliative care consult requested ,to discuss advance directives and complete MOLST ,HCP requests everyhtning to be done ,but aware of patient's wishes not to have tracheostomy and in agreement with his decision Patient also found to be in ARF and to be hyperglycemic

## 2017-06-23 NOTE — GOALS OF CARE CONVERSATION - PERSONAL ADVANCE DIRECTIVE - CONVERSATION DETAILS
spoke to Erika on phone, pt has hcp, requested [place]ez from [facility] soc wk to fax copy. Erika had been in hospital to see pt, aware of pt serious condition. pt had many discussions w her regarding his directives: pt wants to be resuscitated, agrees to ventilator,  pt is full code, but he does not want a trach.  will follow

## 2017-06-23 NOTE — CONSULT NOTE ADULT - PROBLEM SELECTOR PROBLEM 4
Pneumonia due to infectious organism, unspecified laterality, unspecified part of lung
Hypertension
JYOTI on CPAP

## 2017-06-23 NOTE — CONSULT NOTE ADULT - PROBLEM SELECTOR RECOMMENDATION 2
NOT A CANDIDATE FOR SURGERY
BP monitoring,continue current antihypertensive meds, low salt diet,followup with PMD in 1-2 weeks

## 2017-06-23 NOTE — CONSULT NOTE ADULT - SUBJECTIVE AND OBJECTIVE BOX
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  Osteomyelitis of ankle or foot  Diabetic retinopathy associated with type 2 diabetes mellitus: Insulin  Lumbago  Neuropathy in diabetes  Tobacco dependence  Cellulitis  JYOTI on CPAP  Hypertension  Obesity  DVT (deep venous thrombosis)  Diabetes mellitus  No significant past surgical history    FAMILY HISTORY:  Family history of embolic stroke (Uncle)  Family history of acute myocardial infarction (Uncle)  Family history of hypertension (Grandparent, Uncle)  Family history of diabetes mellitus (DM)    HOME MEDICATIONS    REVIEW OF SYSTEMS  Constitutional: No fever, chills, fatigue  Neuro: No headache, numbness, weakness  Resp: No cough, wheezing, shortness of breath  CVS: No chest pain, palpitations, leg swelling  GI: No abdominal pain, nausea, vomiting, diarrhea   : No dysuria, frequency, incontinence  Skin: No itching, burning, rashes, or lesions   Msk: No joint pain or swelling  Psych: No depression, anxiety, mood swings    T(F): 98.1, Max: 100.2 ( @ 05:36)  HR: 100 (88 - 106)  BP: 101/57 (101/57 - 198/93)  RR: 16 (14 - 30)  SpO2: 90% (90% - 100%)  Wt(kg): --        CAPILLARY BLOOD GLUCOSE  354 ( @ 11:57)    I&O's Summary    I & Os for current day (as of  @ 17:36)  =============================================  IN: 760 ml / OUT: 630 ml / NET: 130 ml    PHYSICAL EXAM  General:   CNS:   HEENT:   Resp:   CVS:   Abd:   Ext:   Skin:     MEDICATIONS  piperacillin/tazobactam IVPB. IV Intermittent    metoprolol Oral    insulin lispro (HumaLOG) corrective regimen sliding scale SubCutaneous  dextrose Gel Oral PRN  dextrose 50% Injectable IV Push  dextrose 50% Injectable IV Push  dextrose 50% Injectable IV Push  glucagon  Injectable IntraMuscular PRN  finasteride Oral  dexamethasone  Injectable IV Push    ALBUTerol    90 MICROgram(s) HFA Inhaler Inhalation PRN        heparin  Injectable SubCutaneous  aspirin enteric coated Oral        sodium chloride 0.45%. IV Continuous  dextrose 5%. IV Continuous                                8.0    25.0  )-----------( 197      ( 2017 15:51 )             24.4     Bands 4.0  Bands 8.0        142  |  107  |  100<H>  ----------------------------<  338<H>  5.4<H>   |  29  |  3.50<H>    Ca    9.4      2017 13:57  Phos  3.4       Mg     2.9         TPro  8.0  /  Alb  2.7<L>  /  TBili  0.3  /  DBili  x   /  AST  19  /  ALT  40  /  AlkPhos  195<H>      Lactate 1.3            @ 13:57    Lactate 1.1            @ 07:51    Lactate 1.6            @ 01:52          PT/INR - ( 2017 01:41 )   PT: 13.5 sec;   INR: 1.23 ratio           Urinalysis Basic - ( 2017 01:56 )    Color: Yellow / Appearance: Clear / S.015 / pH: x  Gluc: x / Ketone: Negative  / Bili: Negative / Urobili: Negative   Blood: x / Protein: 500 mg/dL / Nitrite: Negative   Leuk Esterase: Negative / RBC: x / WBC 0-2   Sq Epi: x / Non Sq Epi: x / Bacteria: x            Radiology: ***  Bedside lung ultrasound: ***  Bedside ECHO: ***    CODE STATUS: ***  [facility] discussion: JEANNETTE HPI: 56 y/o male sent from NH for resp distress.     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  Osteomyelitis of ankle or foot  Diabetic retinopathy associated with type 2 diabetes mellitus: Insulin  Lumbago  Neuropathy in diabetes  Tobacco dependence  Cellulitis  JYOTI on CPAP  Hypertension  Obesity  DVT (deep venous thrombosis)  Diabetes mellitus  No significant past surgical history    FAMILY HISTORY:  Family history of embolic stroke (Uncle)  Family history of acute myocardial infarction (Uncle)  Family history of hypertension (Grandparent, Uncle)  Family history of diabetes mellitus (DM)    HOME MEDICATIONS    REVIEW OF SYSTEMS  Constitutional: No fever, chills, fatigue  Neuro: No headache, numbness, weakness  Resp: No cough, wheezing, shortness of breath  CVS: No chest pain, palpitations, leg swelling  GI: No abdominal pain, nausea, vomiting, diarrhea   : No dysuria, frequency, incontinence  Skin: No itching, burning, rashes, or lesions   Msk: No joint pain or swelling  Psych: No depression, anxiety, mood swings    T(F): 98.1, Max: 100.2 ( @ 05:36)  HR: 100 (88 - 106)  BP: 101/57 (101/57 - 198/93)  RR: 16 (14 - 30)  SpO2: 90% (90% - 100%)  Wt(kg): --        CAPILLARY BLOOD GLUCOSE  354 ( @ 11:57)    I&O's Summary    I & Os for current day (as of  @ 17:36)  =============================================  IN: 760 ml / OUT: 630 ml / NET: 130 ml    PHYSICAL EXAM  General:   CNS:   HEENT:   Resp:   CVS:   Abd:   Ext:   Skin:     MEDICATIONS  piperacillin/tazobactam IVPB. IV Intermittent    metoprolol Oral    insulin lispro (HumaLOG) corrective regimen sliding scale SubCutaneous  dextrose Gel Oral PRN  dextrose 50% Injectable IV Push  dextrose 50% Injectable IV Push  dextrose 50% Injectable IV Push  glucagon  Injectable IntraMuscular PRN  finasteride Oral  dexamethasone  Injectable IV Push    ALBUTerol    90 MICROgram(s) HFA Inhaler Inhalation PRN        heparin  Injectable SubCutaneous  aspirin enteric coated Oral        sodium chloride 0.45%. IV Continuous  dextrose 5%. IV Continuous                                8.0    25.0  )-----------( 197      ( 2017 15:51 )             24.4     Bands 4.0  Bands 8.0        142  |  107  |  100<H>  ----------------------------<  338<H>  5.4<H>   |  29  |  3.50<H>    Ca    9.4      2017 13:57  Phos  3.4       Mg     2.9         TPro  8.0  /  Alb  2.7<L>  /  TBili  0.3  /  DBili  x   /  AST  19  /  ALT  40  /  AlkPhos  195<H>      Lactate 1.3            @ 13:57    Lactate 1.1            @ 07:51    Lactate 1.6            @ 01:52          PT/INR - ( 2017 01:41 )   PT: 13.5 sec;   INR: 1.23 ratio           Urinalysis Basic - ( 2017 01:56 )    Color: Yellow / Appearance: Clear / S.015 / pH: x  Gluc: x / Ketone: Negative  / Bili: Negative / Urobili: Negative   Blood: x / Protein: 500 mg/dL / Nitrite: Negative   Leuk Esterase: Negative / RBC: x / WBC 0-2   Sq Epi: x / Non Sq Epi: x / Bacteria: x            Radiology: ***  Bedside lung ultrasound: ***  Bedside ECHO: ***    CODE STATUS: ***  [facility] discussion: JEANNETTE HPI: 58 y/o male sent from NH for resp distress. History obtained from the HCP as patient is not able to provide any info.     He was diagnosed with 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 He did not undergo any surgery as he was a poor candidate. Thereafter, he was sent to a rehab facility where his status worsened overtime. For the last few months, his mental and functional status has deteriorated. He has not been very responsive for 1.5 weeks. He had a CT neck in May 2017 which showed severe airway obstruction. Patient was told he may need a tracheostomy when he was diagnosed with SCC last August however he had been adamantly refusing it.    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  Osteomyelitis of ankle or foot  Diabetic retinopathy associated with type 2 diabetes mellitus: Insulin  Lumbago  Neuropathy in diabetes  Tobacco dependence  Cellulitis  JYOTI on CPAP  Hypertension  Obesity  DVT (deep venous thrombosis)  Diabetes mellitus  No significant past surgical history    FAMILY HISTORY:  Family history of embolic stroke (Uncle)  Family history of acute myocardial infarction (Uncle)  Family history of hypertension (Grandparent, Uncle)  Family history of diabetes mellitus (DM)    HOME MEDICATIONS    REVIEW OF SYSTEMS  Constitutional: No fever, chills, fatigue  Neuro: No headache, numbness, weakness  Resp: No cough, wheezing, shortness of breath  CVS: No chest pain, palpitations, leg swelling  GI: No abdominal pain, nausea, vomiting, diarrhea   : No dysuria, frequency, incontinence  Skin: No itching, burning, rashes, or lesions   Msk: No joint pain or swelling  Psych: No depression, anxiety, mood swings    T(F): 98.1, Max: 100.2 ( @ 05:36)  HR: 100 (88 - 106)  BP: 101/57 (101/57 - 198/93)  RR: 16 (14 - 30)  SpO2: 90% (90% - 100%)  Wt(kg): --        CAPILLARY BLOOD GLUCOSE  354 ( @ 11:57)    I&O's Summary    I & Os for current day (as of  @ 17:36)  =============================================  IN: 760 ml / OUT: 630 ml / NET: 130 ml    PHYSICAL EXAM  General:   CNS:   HEENT:   Resp:   CVS:   Abd:   Ext:   Skin:     MEDICATIONS  piperacillin/tazobactam IVPB. IV Intermittent    metoprolol Oral    insulin lispro (HumaLOG) corrective regimen sliding scale SubCutaneous  dextrose Gel Oral PRN  dextrose 50% Injectable IV Push  dextrose 50% Injectable IV Push  dextrose 50% Injectable IV Push  glucagon  Injectable IntraMuscular PRN  finasteride Oral  dexamethasone  Injectable IV Push    ALBUTerol    90 MICROgram(s) HFA Inhaler Inhalation PRN        heparin  Injectable SubCutaneous  aspirin enteric coated Oral        sodium chloride 0.45%. IV Continuous  dextrose 5%. IV Continuous                                8.0    25.0  )-----------( 197      ( 2017 15:51 )             24.4     Bands 4.0  Bands 8.0        142  |  107  |  100<H>  ----------------------------<  338<H>  5.4<H>   |  29  |  3.50<H>    Ca    9.4      2017 13:57  Phos  3.4       Mg     2.9         TPro  8.0  /  Alb  2.7<L>  /  TBili  0.3  /  DBili  x   /  AST  19  /  ALT  40  /  AlkPhos  195<H>      Lactate 1.3            @ 13:57    Lactate 1.1            @ 07:51    Lactate 1.6            @ 01:52          PT/INR - ( 2017 01:41 )   PT: 13.5 sec;   INR: 1.23 ratio           Urinalysis Basic - ( 2017 01:56 )    Color: Yellow / Appearance: Clear / S.015 / pH: x  Gluc: x / Ketone: Negative  / Bili: Negative / Urobili: Negative   Blood: x / Protein: 500 mg/dL / Nitrite: Negative   Leuk Esterase: Negative / RBC: x / WBC 0-2   Sq Epi: x / Non Sq Epi: x / Bacteria: x            Radiology: ***  Bedside lung ultrasound: ***  Bedside ECHO: ***    CODE STATUS: ***  [facility] discussion: JEANNETTE HPI: 58 y/o male sent from NH for resp distress. History obtained from the HCP as patient is not able to provide any info.     He was diagnosed with 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 He did not undergo any surgery as he was a poor candidate. Thereafter, he was sent to a rehab facility where his status worsened overtime. For the last few months, his mental and functional status has deteriorated. He has not been very responsive for 1.5 weeks. He had a CT neck in May 2017 which showed severe airway obstruction. Patient was told he may need a tracheostomy when he was diagnosed with SCC last August however he had been adamantly refusing it.    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  Osteomyelitis of ankle or foot  Diabetic retinopathy associated with type 2 diabetes mellitus: Insulin  Lumbago  Neuropathy in diabetes  Tobacco dependence  Cellulitis  JYOTI on CPAP  Hypertension  Obesity  DVT (deep venous thrombosis)  Diabetes mellitus  CKD    FAMILY HISTORY:  Family history of embolic stroke (Uncle)  Family history of acute myocardial infarction (Uncle)  Family history of hypertension (Grandparent, Uncle)  Family history of diabetes mellitus (DM)    HOME MEDICATIONS  neurontin  metoprolol  hydralazine  aspirin  lantus  protonix    REVIEW OF SYSTEMS: UTO    T(F): 98.1, Max: 100.2 ( @ 05:36)  HR: 100 (88 - 106)  BP: 101/57 (101/57 - 198/93)  RR: 16 (14 - 30)  SpO2: 90% (90% - 100%)    CAPILLARY BLOOD GLUCOSE  354 ( @ 11:57)    I&O's Summary    I & Os for current day (as of  @ 17:36)  =============================================  IN: 760 ml / OUT: 630 ml / NET: 130 ml    PHYSICAL EXAM  General: AA male, minimally responsive   CNS: tries to open eyes to voice, withdraws to pain on both arms  HEENT: pupils 3 mm b/l and reactive to light  Resp: good air entry b/l, no wheezing, had mild resp distress prior to starting NIV  CVS: S1S2, regular  Abd: soft, +BS  Ext: chronic skin changes with dry skin on legs  Skin: as above    MEDICATIONS  piperacillin/tazobactam IVPB. IV Intermittent  metoprolol Oral  lantus  insulin lispro (HumaLOG) corrective regimen sliding scale SubCutaneous  finasteride Oral  dexamethasone  Injectable IV Push  ALBUTerol    90 MICROgram(s) HFA Inhaler Inhalation PRN  heparin  Injectable SubCutaneous  aspirin enteric coated Oral  sodium chloride 0.45%. IV Continuous                        8.0    25.0  )-----------( 197      ( 2017 15:51 )             24.4     Bands 4.0  Bands 8.0        142  |  107  |  100<H>  ----------------------------<  338<H>  5.4<H>   |  29  |  3.50<H>    Ca    9.4      2017 13:57  Phos  3.4       Mg     2.9         TPro  8.0  /  Alb  2.7<L>  /  TBili  0.3  /  DBili  x   /  AST  19  /  ALT  40  /  AlkPhos  195<H>      Lactate 1.3            @ 13:57    Lactate 1.1            @ 07:51    Lactate 1.6            @ 01:52    PT/INR - ( 2017 01:41 )   PT: 13.5 sec;   INR: 1.23 ratio       Urinalysis Basic - ( 2017 01:56 )    Color: Yellow / Appearance: Clear / S.015 / pH: x  Gluc: x / Ketone: Negative  / Bili: Negative / Urobili: Negative   Blood: x / Protein: 500 mg/dL / Nitrite: Negative   Leuk Esterase: Negative / RBC: x / WBC 0-2   Sq Epi: x / Non Sq Epi: x / Bacteria: x    Radiology: CXR: Left hemidiaphragm is obscured and there is some increased density noted in the retrocardiac region, concerning for left lower lobe infiltrate.     CODE STATUS: full  GOC discussion: Y

## 2017-06-23 NOTE — CONSULT NOTE ADULT - SUBJECTIVE AND OBJECTIVE BOX
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 3.375Gram(s) IV Intermittent every 12 hours  sodium chloride 0.45%. 1000milliLiter(s) IV Continuous <Continuous>  insulin lispro (HumaLOG) corrective regimen sliding scale  SubCutaneous three times a day before meals  dextrose 5%. 1000milliLiter(s) IV Continuous <Continuous>  dextrose 50% Injectable 12.5Gram(s) IV Push once  dextrose 50% Injectable 25Gram(s) IV Push once  dextrose 50% Injectable 25Gram(s) IV Push once  finasteride 5milliGRAM(s) Oral daily  aspirin enteric coated 81milliGRAM(s) Oral daily  metoprolol 100milliGRAM(s) Oral every 12 hours      Allergies    No Known Allergies    Intolerances        SOCIAL HISTORY:  Denies ETOh,Smoking,     FAMILY HISTORY:  Family history of embolic stroke (Uncle)  Family history of acute myocardial infarction (Uncle)  Family history of hypertension (Grandparent, Uncle)  Family history of diabetes mellitus (DM)      REVIEW OF SYSTEMS:    CONSTITUTIONAL: No weakness, fevers or chills  EYES/ENT: No visual changes;  No vertigo or throat pain   NECK: No pain or stiffness  RESPIRATORY: No cough, wheezing, hemoptysis; No shortness of breath  CARDIOVASCULAR: No chest pain or palpitations  GASTROINTESTINAL: No abdominal or epigastric pain. No nausea, vomiting, or hematemesis; No diarrhea or constipation. No melena or hematochezia.  GENITOURINARY: No dysuria, frequency or hematuria  NEUROLOGICAL: No numbness or weakness  SKIN: No itching, burning, rashes, or lesions   All other review of systems is negative unless indicated above.    VITAL:  T(C): , Max: 37.9 ( @ 05:36)  T(F): , Max: 100.2 (- @ 05:36)  HR: 102  BP: 142/65  BP(mean): 93  RR: 17  SpO2: 99%  Wt(kg): --    I and O's:    I & Os for current day (as of  @ 16:45)  =============================================  IN: 760 ml / OUT: 630 ml / NET: 130 ml    Height (cm): 185.4 ( @ 11:15)  Weight (kg): 106.3 ( @ 11:15)  BMI (kg/m2): 30.9 ( @ 11:15)  BSA (m2): 2.3 ( @ 11:15)    PHYSICAL EXAM:    Constitutional: NAD  HEENT: PERRLA, EOMI,    Neck: No LAD, No JVD  Respiratory: CTAB  Cardiovascular: S1 and S2  Gastrointestinal: BS+, soft, NT/ND  Extremities: No peripheral edema  Neurological: A/O x 3, no focal deficits  Psychiatric: Normal mood, normal affect  : No Daly  Skin: No rashes  Access: Not applicable    LABS:                        8.0    25.0  )-----------( 197      ( 2017 15:51 )             24.4         142  |  107  |  100<H>  ----------------------------<  338<H>  5.4<H>   |  29  |  3.50<H>    Ca    9.4      2017 13:57  Phos  3.4       Mg     2.9         TPro  8.0  /  Alb  2.7<L>  /  TBili  0.3  /  DBili  x   /  AST  19  /  ALT  40  /  AlkPhos  195<H>        Urine Studies:  Urinalysis Basic - ( 2017 01:56 )    Color: Yellow / Appearance: Clear / S.015 / pH: x  Gluc: x / Ketone: Negative  / Bili: Negative / Urobili: Negative   Blood: x / Protein: 500 mg/dL / Nitrite: Negative   Leuk Esterase: Negative / RBC: x / WBC 0-2   Sq Epi: x / Non Sq Epi: x / Bacteria: x            RADIOLOGY & ADDITIONAL STUDIES: Patient is a 57y Male whom presented to the hospital with ckd and zacarias     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 3.375Gram(s) IV Intermittent every 12 hours  sodium chloride 0.45%. 1000milliLiter(s) IV Continuous <Continuous>  insulin lispro (HumaLOG) corrective regimen sliding scale  SubCutaneous three times a day before meals  dextrose 5%. 1000milliLiter(s) IV Continuous <Continuous>  dextrose 50% Injectable 12.5Gram(s) IV Push once  dextrose 50% Injectable 25Gram(s) IV Push once  dextrose 50% Injectable 25Gram(s) IV Push once  finasteride 5milliGRAM(s) Oral daily  aspirin enteric coated 81milliGRAM(s) Oral daily  metoprolol 100milliGRAM(s) Oral every 12 hours      Allergies    No Known Allergies    Intolerances        SOCIAL HISTORY:  Denies ETOh,Smoking,     FAMILY HISTORY:  Family history of embolic stroke (Uncle)  Family history of acute myocardial infarction (Uncle)  Family history of hypertension (Grandparent, Uncle)  Family history of diabetes mellitus (DM)      REVIEW OF SYSTEMS:    unable to get a good ros      unable to obtained medical records     VITAL:  T(C): , Max: 37.9 ( @ 05:36)  T(F): , Max: 100.2 ( @ 05:36)  HR: 102  BP: 142/65  BP(mean): 93  RR: 17  SpO2: 99%  Wt(kg): --    I and O's:    I & Os for current day (as of  @ 16:45)  =============================================  IN: 760 ml / OUT: 630 ml / NET: 130 ml    Height (cm): 185.4 ( @ 11:15)  Weight (kg): 106.3 ( @ 11:15)  BMI (kg/m2): 30.9 ( @ 11:15)  BSA (m2): 2.3 ( @ 11:15)    PHYSICAL EXAM:    Constitutional: NAD  Neck: No LAD, No JVD  Respiratory: decrease bs b/l   Cardiovascular: S1 and S2  Gastrointestinal: BS+, soft, NT/ND  Extremities: No peripheral edema  Neurological:  no focal deficits  : pos  Daly  Skin: No rashes  Access: Not applicable    LABS:                        8.0    25.0  )-----------( 197      ( 2017 15:51 )             24.4         142  |  107  |  100<H>  ----------------------------<  338<H>  5.4<H>   |  29  |  3.50<H>    Ca    9.4      2017 13:57  Phos  3.4       Mg     2.9         TPro  8.0  /  Alb  2.7<L>  /  TBili  0.3  /  DBili  x   /  AST  19  /  ALT  40  /  AlkPhos  195<H>        Urine Studies:  Urinalysis Basic - ( 2017 01:56 )    Color: Yellow / Appearance: Clear / S.015 / pH: x  Gluc: x / Ketone: Negative  / Bili: Negative / Urobili: Negative   Blood: x / Protein: 500 mg/dL / Nitrite: Negative   Leuk Esterase: Negative / RBC: x / WBC 0-2   Sq Epi: x / Non Sq Epi: x / Bacteria: x            RADIOLOGY & ADDITIONAL STUDIES:

## 2017-06-23 NOTE — CONSULT NOTE ADULT - ASSESSMENT
PT    WITH   FEVER ,LETHARGY R/O SEPSIS  -R/O PN IV ANTIBIOTICS   PT VERY LETHARGIC - S/P PEG - PROGNOSIS  VERY POOR
Right Stage 1 heel ulcer    Patient seen and elevated  Offloading to the Right heel    will follow
upper airway obstruction, diabetes, dementia, hx of tonsil cancer dysphagia. After review of records there is no definitive cause for dementia and no evidence of metastatic disease or recurrence. Significant selling may be secondary to radiation vs tumor. Would need PET scan and or MRI to define underlying tumor status. To perform such tests would need to be off cpap. Patient requires a tracheostomy and a direct laryngoscopy and exam under anesthesia to define etiology of obstruction. Needs MRI of brain to r/o metastatic disease vs cva or diabetic encephalopathy or secondary to renal failure.
PROBLEM ASSESSMENT PLAN   UAO  6/23 ICU monitoring  ENT urgent eval requested   HO Tonsillar SCC with mets neck diagnosed 5/2016 Unclear as to whether he got any RT or NT   RESP   Monitor PO ABG Target PO 90-96%   6/23/2017 ABG on 100% (9a) 734/54/92  ASPIRATION PNEUMONIA   6/23/2017 W 24.5  6/23 LA 1.6-1.1  6/22/2017 CXR lll infiltr  zosyn (6/23)   COPD   albuterol hfa.4p (6/23)   CAD  ASA 81 (6/23) metoprolol 100.2 (6/23)   CARMEL   8/9/2016-6/23/2017 Cr 3.5-3.9   1/2  (6/23)   ANEMIA LIKELY SEC CKD   8/9/2016-6/23/2017  Hb 8.1-8.5  PREEXISTING ISSUES   PROBLEM:  BPH Fonasteride 5 (6/23)                GLOBAL ISSUE/BEST PRACTICE:        PROBLEM:      Analgesia:     na                        PROBLEM: Sedation:     na               PROBLEM: HOB elevation:   yes             PROBLEM: Stress ulcer proph:    na                      PROBLEM: VTE prophylaxis:      hsc (6/23)                        PROBLEM: Glycemic control:    iss (6/23)             PROBLEM: Nutrition:    na                      PROBLEM: Advanced directive: na     PROBLEM: Allergies:  na  TIME SPENT Over 55 minutes aggregate time spent on encounter; activities included   direct patient care, counseling and/or coordinating care reviewing notes, lab data/ imaging , discussion with multidisciplinary team/ patient  /family. Risks, benefits, alternatives  discussed in detail. Questions/concerns  were addressed  to the best of my ability .
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

## 2017-06-23 NOTE — CONSULT NOTE ADULT - PROBLEM SELECTOR PROBLEM 3
Diabetes mellitus
Diabetes mellitus
Type 2 diabetes mellitus with other neurologic complication, without long-term current use of insulin

## 2017-06-23 NOTE — PROGRESS NOTE ADULT - PROBLEM SELECTOR PLAN 5
Possible ATN  Continue IV fluid hydration  Monitor electrolytes closely, and replace as needed  Hyperkalemia stable at this time but consider treatment of hyperkalemia if worsens or symptomatic  Monitor I&Os  Avoid nephrotoxic agents (hold IV contrast studies at this time), and renally dose medications

## 2017-06-23 NOTE — CONSULT NOTE ADULT - PROBLEM SELECTOR RECOMMENDATION 9
ON  IV ANTIBIOTICS
ACUTE RENAL FAILURE: due to hypoperfusion sepsis , possible atn   non oliguric atn with good urine out put   continue with iv fluid aggressively    Serum creatinine is more thn 3    , approximating GFR is decreased    ml/min.   [There is no progression]. [No uremic symptoms]   [No evidence of anemia].  Fluid status stable.  Will continue to avoid nephrotoxic drugs.  Patient remains asymptomatic.   Continue current therapy.

## 2017-06-23 NOTE — H&P ADULT - PROBLEM SELECTOR PLAN 1
BIPAP ,ABG monitoring ,pulm and icu attending followup Patient requires tracheostomy ,but he refuses it since august 2016

## 2017-06-23 NOTE — CONSULT NOTE ADULT - ATTENDING COMMENTS
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    -NPO, aspiration precautions  -avoid benzos, opioids, other CNS meds  -dexamethasone 4 mg q6  -ENT eval, will likely need trach  -BP stable, continue metoprolol, ivf  -empiric vanc, zosyn, f/u cx  -continue bipap, ABG acceptable, repeat in am  -continue aspirin  -UO adequate, no need for emergent HD, K stable  -check urine lytes, continue spivey  -lantus, ISS  -prognosis poor  -d/w HCP, if trach is the only option for survival, she would prefer hospice care per patient's prior wishes  -cc 50 mins
I personally evaluated and examined patient

## 2017-06-23 NOTE — CONSULT NOTE ADULT - PROBLEM SELECTOR RECOMMENDATION 4
ON METOPROLOL
Admit for septic workup and ID evaluation,send blood and urine cx,serial lactate levels,monitor vitals closley,ivfs hydration,monitor urine output and renal profile,iv abx as per id cons

## 2017-06-23 NOTE — CONSULT NOTE ADULT - SUBJECTIVE AND OBJECTIVE BOX
CARDIOLOGY CONSULT NOTE    Patient is a 57y Male with a known history of :    HPI:      REVIEW OF SYSTEMS:    CONSTITUTIONAL: No fever, weight loss, or fatigue  EYES: No eye pain, visual disturbances, or discharge  ENMT:  No difficulty hearing, tinnitus, vertigo; No sinus or throat pain  NECK: No pain or stiffness  BREASTS: No pain, masses, or nipple discharge  RESPIRATORY: No cough, wheezing, chills or hemoptysis; No shortness of breath  CARDIOVASCULAR: No chest pain, palpitations, dizziness, or leg swelling  GASTROINTESTINAL: No abdominal or epigastric pain. No nausea, vomiting, or hematemesis; No diarrhea or constipation. No melena or hematochezia.  GENITOURINARY: No dysuria, frequency, hematuria, or incontinence  NEUROLOGICAL: No headaches, memory loss, loss of strength, numbness, or tremors  SKIN: No itching, burning, rashes, or lesions   LYMPH NODES: No enlarged glands  ENDOCRINE: No heat or cold intolerance; No hair loss  MUSCULOSKELETAL: No joint pain or swelling; No muscle, back, or extremity pain  PSYCHIATRIC: No depression, anxiety, mood swings, or difficulty sleeping  HEME/LYMPH: No easy bruising, or bleeding gums  ALLERGY AND IMMUNOLOGIC: No hives or eczema    MEDICATIONS  (STANDING):  heparin  Injectable 5000Unit(s) SubCutaneous every 12 hours  piperacillin/tazobactam IVPB. 3.375Gram(s) IV Intermittent every 12 hours  sodium chloride 0.45%. 1000milliLiter(s) IV Continuous <Continuous>  insulin lispro (HumaLOG) corrective regimen sliding scale  SubCutaneous three times a day before meals  dextrose 5%. 1000milliLiter(s) IV Continuous <Continuous>  dextrose 50% Injectable 12.5Gram(s) IV Push once  dextrose 50% Injectable 25Gram(s) IV Push once  dextrose 50% Injectable 25Gram(s) IV Push once  finasteride 5milliGRAM(s) Oral daily  aspirin enteric coated 81milliGRAM(s) Oral daily  metoprolol 100milliGRAM(s) Oral every 12 hours    MEDICATIONS  (PRN):  dextrose Gel 1Dose(s) Oral once PRN Blood Glucose LESS THAN 70 milliGRAM(s)/deciliter  glucagon  Injectable 1milliGRAM(s) IntraMuscular once PRN Glucose LESS THAN 70 milligrams/deciliter  ALBUTerol    90 MICROgram(s) HFA Inhaler 2Puff(s) Inhalation every 6 hours PRN Shortness of Breath and/or Wheezing      ALLERGIES: No Known Allergies      Social History:     FAMILY HISTORY:  Family history of embolic stroke (Uncle)  Family history of acute myocardial infarction (Uncle)  Family history of hypertension (Grandparent, Uncle)  Family history of diabetes mellitus (DM)      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  Osteomyelitis of ankle or foot  Diabetic retinopathy associated with type 2 diabetes mellitus: Insulin  Lumbago  Neuropathy in diabetes  Tobacco dependence  Cellulitis  JYOTI on CPAP  Hypertension  Obesity  DVT (deep venous thrombosis)  Diabetes mellitus  No significant past surgical history        PHYSICAL EXAMINATION:  -----------------------------  T(C): 37.8, Max: 37.9 (06-23 @ 05:36)  HR: 94 (88 - 94)  BP: 123/65 (111/68 - 131/66)  RR: 18 (18 - 30)  SpO2: 100% (98% - 100%)  Wt(kg): --      Weight (kg): 111.6 (06-23 @ 01:01)    Constitutional: well developed, normal appearance, well groomed, well nourished, no deformities and no acute distress.   Eyes: the conjunctiva exhibited no abnormalities and the eyelids demonstrated no xanthelasmas.   HEENT: normal oral mucosa, no oral pallor and no oral cyanosis.   Neck: normal jugular venous A waves present, normal jugular venous V waves present and no jugular venous marrufo A waves.   Pulmonary: no respiratory distress, normal respiratory rhythm and effort, no accessory muscle use and lungs were clear to auscultation bilaterally.   Cardiovascular: heart rate and rhythm were normal, normal S1 and S2 and no murmur, gallop, rub, heave or thrill are present.   Abdomen: soft, non-tender, no hepato-splenomegaly and no abdominal mass palpated.   Musculoskeletal: the gait could not be assessed..   Extremities: no clubbing of the fingernails, no localized cyanosis, no petechial hemorrhages and no ischemic changes.   Skin: normal skin color and pigmentation, no rash, no venous stasis, no skin lesions, no skin ulcer and no xanthoma was observed.   Psychiatric: oriented to person, place, and time, the affect was normal, the mood was normal and not feeling anxious.     LABS:   --------  06-23    142  |  104  |  112<H>  ----------------------------<  338<H>  5.5<H>   |  35<H>  |  3.90<H>    Ca    10.1      23 Jun 2017 01:41    TPro  8.0  /  Alb  2.7<L>  /  TBili  0.3  /  DBili  x   /  AST  19  /  ALT  40  /  AlkPhos  195<H>  06-23                         8.5    24.5  )-----------( 248      ( 23 Jun 2017 01:41 )             26.2     PT/INR - ( 23 Jun 2017 01:41 )   PT: 13.5 sec;   INR: 1.23 ratio                     RADIOLOGY:  -----------------        ECG:     ECHO

## 2017-06-23 NOTE — PROGRESS NOTE ADULT - PROBLEM SELECTOR PLAN 3
Continue Zosyn for LLL aspiration PNA  IV fluid hydration  Blood and urine cultures pending  Will send procalcitonin with AM labs  Monitor I&Os

## 2017-06-24 DIAGNOSIS — D49.0 NEOPLASM OF UNSPECIFIED BEHAVIOR OF DIGESTIVE SYSTEM: ICD-10-CM

## 2017-06-24 DIAGNOSIS — G93.41 METABOLIC ENCEPHALOPATHY: ICD-10-CM

## 2017-06-24 LAB
ALBUMIN SERPL ELPH-MCNC: 2.1 G/DL — LOW (ref 3.3–5)
ALP SERPL-CCNC: 146 U/L — HIGH (ref 40–120)
ALT FLD-CCNC: 29 U/L — SIGNIFICANT CHANGE UP (ref 12–78)
ANION GAP SERPL CALC-SCNC: 4 MMOL/L — LOW (ref 5–17)
APTT BLD: 29.2 SEC — SIGNIFICANT CHANGE UP (ref 27.5–37.4)
AST SERPL-CCNC: 23 U/L — SIGNIFICANT CHANGE UP (ref 15–37)
BASE EXCESS BLDA CALC-SCNC: 3.7 MMOL/L — HIGH (ref -2–2)
BASOPHILS # BLD AUTO: 0 K/UL — SIGNIFICANT CHANGE UP (ref 0–0.2)
BASOPHILS NFR BLD AUTO: 0.1 % — SIGNIFICANT CHANGE UP (ref 0–2)
BILIRUB SERPL-MCNC: 0.3 MG/DL — SIGNIFICANT CHANGE UP (ref 0.2–1.2)
BLOOD GAS COMMENTS ARTERIAL: SIGNIFICANT CHANGE UP
BUN SERPL-MCNC: 92 MG/DL — HIGH (ref 7–23)
CALCIUM SERPL-MCNC: 9 MG/DL — SIGNIFICANT CHANGE UP (ref 8.5–10.1)
CHLORIDE SERPL-SCNC: 109 MMOL/L — HIGH (ref 96–108)
CK SERPL-CCNC: 232 U/L — SIGNIFICANT CHANGE UP (ref 26–308)
CO2 SERPL-SCNC: 32 MMOL/L — HIGH (ref 22–31)
CREAT SERPL-MCNC: 3.1 MG/DL — HIGH (ref 0.5–1.3)
CULTURE RESULTS: NO GROWTH — SIGNIFICANT CHANGE UP
CULTURE RESULTS: NO GROWTH — SIGNIFICANT CHANGE UP
EOSINOPHIL # BLD AUTO: 0 K/UL — SIGNIFICANT CHANGE UP (ref 0–0.5)
EOSINOPHIL NFR BLD AUTO: 0 % — SIGNIFICANT CHANGE UP (ref 0–6)
GLUCOSE SERPL-MCNC: 222 MG/DL — HIGH (ref 70–99)
HBA1C BLD-MCNC: 7.3 % — HIGH (ref 4–5.6)
HCO3 BLDA-SCNC: 27 MMOL/L — SIGNIFICANT CHANGE UP (ref 23–27)
HCT VFR BLD CALC: 22.3 % — LOW (ref 39–50)
HGB BLD-MCNC: 7.3 G/DL — LOW (ref 13–17)
HOROWITZ INDEX BLDA+IHG-RTO: 40 — SIGNIFICANT CHANGE UP
INR BLD: 1.23 RATIO — HIGH (ref 0.88–1.16)
LYMPHOCYTES # BLD AUTO: 1 K/UL — SIGNIFICANT CHANGE UP (ref 1–3.3)
LYMPHOCYTES # BLD AUTO: 5.6 % — LOW (ref 13–44)
MAGNESIUM SERPL-MCNC: 2.9 MG/DL — HIGH (ref 1.6–2.6)
MCHC RBC-ENTMCNC: 32.7 GM/DL — SIGNIFICANT CHANGE UP (ref 32–36)
MCHC RBC-ENTMCNC: 35.1 PG — HIGH (ref 27–34)
MCV RBC AUTO: 107.2 FL — HIGH (ref 80–100)
MONOCYTES # BLD AUTO: 0.6 K/UL — SIGNIFICANT CHANGE UP (ref 0–0.9)
MONOCYTES NFR BLD AUTO: 3.5 % — SIGNIFICANT CHANGE UP (ref 1–9)
NEUTROPHILS # BLD AUTO: 15.6 K/UL — HIGH (ref 1.8–7.4)
NEUTROPHILS NFR BLD AUTO: 90.8 % — HIGH (ref 43–77)
PCO2 BLDA: 52 MMHG — HIGH (ref 32–46)
PH BLDA: 7.36 — SIGNIFICANT CHANGE UP (ref 7.35–7.45)
PHOSPHATE SERPL-MCNC: 3.3 MG/DL — SIGNIFICANT CHANGE UP (ref 2.5–4.5)
PLATELET # BLD AUTO: 196 K/UL — SIGNIFICANT CHANGE UP (ref 150–400)
PO2 BLDA: 142 MMHG — HIGH (ref 74–108)
POTASSIUM SERPL-MCNC: 4.4 MMOL/L — SIGNIFICANT CHANGE UP (ref 3.5–5.3)
POTASSIUM SERPL-SCNC: 4.4 MMOL/L — SIGNIFICANT CHANGE UP (ref 3.5–5.3)
PROCALCITONIN SERPL-MCNC: 1.56 NG/ML — HIGH (ref 0–0.04)
PROT SERPL-MCNC: 7 G/DL — SIGNIFICANT CHANGE UP (ref 6–8.3)
PROTHROM AB SERPL-ACNC: 13.5 SEC — HIGH (ref 9.8–12.7)
RBC # BLD: 2.08 M/UL — LOW (ref 4.2–5.8)
RBC # FLD: 12.2 % — SIGNIFICANT CHANGE UP (ref 10.3–14.5)
SAO2 % BLDA: 99 % — HIGH (ref 92–96)
SODIUM SERPL-SCNC: 145 MMOL/L — SIGNIFICANT CHANGE UP (ref 135–145)
SPECIMEN SOURCE: SIGNIFICANT CHANGE UP
SPECIMEN SOURCE: SIGNIFICANT CHANGE UP
WBC # BLD: 17.2 K/UL — HIGH (ref 3.8–10.5)
WBC # FLD AUTO: 17.2 K/UL — HIGH (ref 3.8–10.5)

## 2017-06-24 PROCEDURE — 99291 CRITICAL CARE FIRST HOUR: CPT

## 2017-06-24 RX ORDER — VANCOMYCIN HCL 1 G
1000 VIAL (EA) INTRAVENOUS ONCE
Qty: 0 | Refills: 0 | Status: COMPLETED | OUTPATIENT
Start: 2017-06-24 | End: 2017-06-24

## 2017-06-24 RX ORDER — DEXAMETHASONE 0.5 MG/5ML
4 ELIXIR ORAL EVERY 12 HOURS
Qty: 0 | Refills: 0 | Status: DISCONTINUED | OUTPATIENT
Start: 2017-06-24 | End: 2017-06-25

## 2017-06-24 RX ORDER — PANTOPRAZOLE SODIUM 20 MG/1
40 TABLET, DELAYED RELEASE ORAL
Qty: 0 | Refills: 0 | Status: DISCONTINUED | OUTPATIENT
Start: 2017-06-24 | End: 2017-06-28

## 2017-06-24 RX ORDER — SODIUM CHLORIDE 9 MG/ML
1000 INJECTION, SOLUTION INTRAVENOUS
Qty: 0 | Refills: 0 | Status: DISCONTINUED | OUTPATIENT
Start: 2017-06-24 | End: 2017-06-25

## 2017-06-24 RX ORDER — HEPARIN SODIUM 5000 [USP'U]/ML
5000 INJECTION INTRAVENOUS; SUBCUTANEOUS EVERY 8 HOURS
Qty: 0 | Refills: 0 | Status: DISCONTINUED | OUTPATIENT
Start: 2017-06-24 | End: 2017-06-28

## 2017-06-24 RX ORDER — PIPERACILLIN AND TAZOBACTAM 4; .5 G/20ML; G/20ML
3.38 INJECTION, POWDER, LYOPHILIZED, FOR SOLUTION INTRAVENOUS EVERY 8 HOURS
Qty: 0 | Refills: 0 | Status: DISCONTINUED | OUTPATIENT
Start: 2017-06-24 | End: 2017-06-28

## 2017-06-24 RX ADMIN — SODIUM CHLORIDE 100 MILLILITER(S): 9 INJECTION, SOLUTION INTRAVENOUS at 05:26

## 2017-06-24 RX ADMIN — PIPERACILLIN AND TAZOBACTAM 25 GRAM(S): 4; .5 INJECTION, POWDER, LYOPHILIZED, FOR SOLUTION INTRAVENOUS at 05:25

## 2017-06-24 RX ADMIN — INSULIN GLARGINE 20 UNIT(S): 100 INJECTION, SOLUTION SUBCUTANEOUS at 22:46

## 2017-06-24 RX ADMIN — Medication 250 MILLIGRAM(S): at 10:23

## 2017-06-24 RX ADMIN — Medication 6: at 13:31

## 2017-06-24 RX ADMIN — Medication 4: at 10:15

## 2017-06-24 RX ADMIN — Medication 4: at 05:26

## 2017-06-24 RX ADMIN — HEPARIN SODIUM 5000 UNIT(S): 5000 INJECTION INTRAVENOUS; SUBCUTANEOUS at 22:46

## 2017-06-24 RX ADMIN — Medication 4: at 22:46

## 2017-06-24 RX ADMIN — Medication 4 MILLIGRAM(S): at 05:27

## 2017-06-24 RX ADMIN — PANTOPRAZOLE SODIUM 40 MILLIGRAM(S): 20 TABLET, DELAYED RELEASE ORAL at 11:43

## 2017-06-24 RX ADMIN — PIPERACILLIN AND TAZOBACTAM 25 GRAM(S): 4; .5 INJECTION, POWDER, LYOPHILIZED, FOR SOLUTION INTRAVENOUS at 22:45

## 2017-06-24 RX ADMIN — FINASTERIDE 5 MILLIGRAM(S): 5 TABLET, FILM COATED ORAL at 11:43

## 2017-06-24 RX ADMIN — HEPARIN SODIUM 5000 UNIT(S): 5000 INJECTION INTRAVENOUS; SUBCUTANEOUS at 05:26

## 2017-06-24 RX ADMIN — HEPARIN SODIUM 5000 UNIT(S): 5000 INJECTION INTRAVENOUS; SUBCUTANEOUS at 15:02

## 2017-06-24 RX ADMIN — Medication 4 MILLIGRAM(S): at 17:46

## 2017-06-24 RX ADMIN — PIPERACILLIN AND TAZOBACTAM 25 GRAM(S): 4; .5 INJECTION, POWDER, LYOPHILIZED, FOR SOLUTION INTRAVENOUS at 15:02

## 2017-06-24 RX ADMIN — Medication 100 MILLIGRAM(S): at 17:46

## 2017-06-24 RX ADMIN — Medication 100 MILLIGRAM(S): at 05:26

## 2017-06-24 RX ADMIN — Medication 4 MILLIGRAM(S): at 11:43

## 2017-06-24 RX ADMIN — Medication 6: at 17:45

## 2017-06-24 RX ADMIN — Medication 81 MILLIGRAM(S): at 11:43

## 2017-06-24 RX ADMIN — Medication 6: at 02:09

## 2017-06-24 NOTE — PROGRESS NOTE ADULT - PROBLEM SELECTOR PLAN 3
DIABETES:   not Good control, continue current therapy.  [Control is    poor ].   Encourage weight loss.

## 2017-06-24 NOTE — PROGRESS NOTE ADULT - ASSESSMENT
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

## 2017-06-24 NOTE — PROGRESS NOTE ADULT - PROBLEM SELECTOR PLAN 1
CARMEL (acute kidney injury). Recommendation: ACUTE RENAL FAILURE: due to hypoperfusion sepsis , possible atn   non oliguric atn with good urine out put   continue with iv fluid aggressively    Serum creatinine is more thn 3    , approximating GFR is decreased    ml/min.   [There is no progression]. [No uremic symptoms]   [No evidence of anemia].  Fluid status stable.  Will continue to avoid nephrotoxic drugs.  Patient remains asymptomatic.   Continue current therapy..

## 2017-06-24 NOTE — PROGRESS NOTE ADULT - SUBJECTIVE AND OBJECTIVE BOX
ICU Progress Note    HPI: see below    S:    Pt seen and examined  HD # 2  Pt here for sepsis 2/2 PNA with upper airway obstruction 2/2 Hx of oropharyngeal SCC  PMHx dementia (unk etiology), metastatic oropharyngeal SCC, DM, JYOTI on bipap   Placed on bipap  CARMEL on CKD  Pt mental status has improved  High risk for resp decompensation  ENT consulted, recommended trach, biopsy for recurrent CA v radiation induced swelling  HCP decided to make Pt DNR/DNI    PRESSORS: [ ] YES [ x] NO  WHICH:  DOSE:    Allergies    No Known Allergies    Intolerances        MEDICATIONS  (STANDING):  finasteride 5milliGRAM(s) Oral daily  aspirin enteric coated 81milliGRAM(s) Oral daily  metoprolol 100milliGRAM(s) Oral every 12 hours  insulin glargine Injectable (LANTUS) 20Unit(s) SubCutaneous at bedtime  insulin lispro (HumaLOG) corrective regimen sliding scale  SubCutaneous every 4 hours  dextrose 5%. 1000milliLiter(s) IV Continuous <Continuous>  dextrose 50% Injectable 12.5Gram(s) IV Push once  dextrose 50% Injectable 25Gram(s) IV Push once  dextrose 50% Injectable 25Gram(s) IV Push once  pantoprazole   Suspension 40milliGRAM(s) Oral before breakfast  heparin  Injectable 5000Unit(s) SubCutaneous every 8 hours  piperacillin/tazobactam IVPB. 3.375Gram(s) IV Intermittent every 8 hours  sodium chloride 0.45%. 1000milliLiter(s) IV Continuous <Continuous>  dexamethasone  Injectable 4milliGRAM(s) IV Push every 12 hours    MEDICATIONS  (PRN):  ALBUTerol    90 MICROgram(s) HFA Inhaler 2Puff(s) Inhalation every 6 hours PRN Shortness of Breath and/or Wheezing  dextrose Gel 1Dose(s) Oral once PRN Blood Glucose LESS THAN 70 milliGRAM(s)/deciliter  glucagon  Injectable 1milliGRAM(s) IntraMuscular once PRN Glucose LESS THAN 70 milligrams/deciliter      Drug Dosing Weight  Height (cm): 185.4 (2017 11:15)  Weight (kg): 106.3 (2017 11:15)  BMI (kg/m2): 30.9 (2017 11:15)  BSA (m2): 2.3 (2017 11:15)    CENTRAL LINE: [ ] YES [ x] NO  LOCATION:   DATE INSERTED:  REMOVE: [ ] YES [ ] NO  EXPLAIN:    ANDINO: [ ] YES [x ] NO    DATE INSERTED:  REMOVE: [ ] YES [ ] NO  EXPLAIN:    A-LINE: [ ] YES [ x NO  LOCATION:   DATE INSERTED:  REMOVE: [ ] YES [ ] NO  EXPLAIN:    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    O:    ICU Vital Signs Last 24 Hrs  T(C): 36.8, Max: 37.1 ( @ 18:00)  T(F): 98.2, Max: 98.8 ( @ 18:00)  HR: 70 (61 - 93)  BP: 159/74 (97/59 - 172/79)  BP(mean): 107 (68 - 116)  ABP: --  ABP(mean): --  RR: 11 (9 - 31)  SpO2: 100% (99% - 100%)        ABG - ( 2017 06:09 )  pH: 7.36  /  pCO2: 52    /  pO2: 142   / HCO3: 27    / Base Excess: 3.7   /  SaO2: 99                  I&O's Detail  I & Os for 24h ending 2017 07:00  =============================================  IN:    sodium chloride 0.45%: 2100 ml    Solution: 200 ml    Oral Fluid: 120 ml    Total IN: 2420 ml  ---------------------------------------------  OUT:    Indwelling Catheter - Urethral: 1930 ml    Total OUT: 1930 ml  ---------------------------------------------  Total NET: 490 ml    I & Os for current day (as of 2017 23:46)  =============================================  IN:    sodium chloride 0.45%.: 825 ml    sodium chloride 0.45%: 500 ml    Glucerna: 310 ml    Solution: 250 ml    Solution: 175 ml    Total IN: 2060 ml  ---------------------------------------------  OUT:    Indwelling Catheter - Urethral: 1330 ml    Voided: 400 ml    Nasoenteral Tube: 50 ml    Total OUT: 1780 ml  ---------------------------------------------  Total NET: 280 ml          PE:    Constitutional: Adult M lying in bed in NAD.   Neck: No JVD, trachea midline.   Respiratory: CTA B/L good BS B/L no W/R/R.  Cardiovascular: S1S2+ RRR no M/R/G.  Gastrointestinal: Soft, NTND. + PEG. Obese.  Extremities: No peripheral edema, No cyanosis, clubbing.  Neurological: Awake, speaks but slow to answer, no gross deficits.   Skin: Chronic appearing venous stasis changes B/L LE.    LABS:    CBC Full  -  ( 2017 06:45 )  WBC Count : 17.2 K/uL  Hemoglobin : 7.3 g/dL  Hematocrit : 22.3 %  Platelet Count - Automated : 196 K/uL  Mean Cell Volume : 107.2 fl  Mean Cell Hemoglobin : 35.1 pg  Mean Cell Hemoglobin Concentration : 32.7 gm/dL  Auto Neutrophil # : 15.6 K/uL  Auto Lymphocyte # : 1.0 K/uL  Auto Monocyte # : 0.6 K/uL  Auto Eosinophil # : 0.0 K/uL  Auto Basophil # : 0.0 K/uL  Auto Neutrophil % : 90.8 %  Auto Lymphocyte % : 5.6 %  Auto Monocyte % : 3.5 %  Auto Eosinophil % : 0.0 %  Auto Basophil % : 0.1 %        145  |  109<H>  |  92<H>  ----------------------------<  222<H>  4.4   |  32<H>  |  3.10<H>    Ca    9.0      2017 06:45  Phos  3.3       Mg     2.9         TPro  7.0  /  Alb  2.1<L>  /  TBili  0.3  /  DBili  x   /  AST  23  /  ALT  29  /  AlkPhos  146<H>      PT/INR - ( 2017 06:45 )   PT: 13.5 sec;   INR: 1.23 ratio         PTT - ( 2017 06:45 )  PTT:29.2 sec  Urinalysis Basic - ( 2017 01:56 )    Color: Yellow / Appearance: Clear / S.015 / pH: x  Gluc: x / Ketone: Negative  / Bili: Negative / Urobili: Negative   Blood: x / Protein: 500 mg/dL / Nitrite: Negative   Leuk Esterase: Negative / RBC: x / WBC 0-2   Sq Epi: x / Non Sq Epi: x / Bacteria: x      CAPILLARY BLOOD GLUCOSE  223 (2017 22:00)  251 (2017 18:00)  262 (2017 14:00)  237 (2017 10:00)  234 (2017 05:22)  263 (2017 02:00)    CARDIAC MARKERS ( 2017 06:45 )  x     / x     / 232 U/L / x     / x          LIVER FUNCTIONS - ( 2017 06:45 )  Alb: 2.1 g/dL / Pro: 7.0 g/dL / ALK PHOS: 146 U/L / ALT: 29 U/L / AST: 23 U/L / GGT: x             A:    57yMale  HD # 2    Here for:    1. Sepsis 2/2  2. PNA, complicated by  3. Upper airway obstruction, 2/2  4. Orophayngeal CA  5. JYOTI on bipap  6. DM  7. HTN    Pt requires critical care for high risk of airway compromise at this time    P:    Neuro: GCS 15. Monitor for delirium.  Continue to optimize pain control. Serial Neurologic assessments.    HEENT: Cont monitoring.    CV: Continue hemodynamic monitoring, lopressor.    Pulm: Pulmonary toilet.  Continue incentive spirometer.  Chest PT.  Encourage OOB to chair and ambulation. Nebs.    GI/Nutrition: Cont diet via PEG.    /Renal: Monitor UOP. Monitor BMP.  Replete Lytes as needed.    HEME- DVT prophylaxis, SCDs, f/u CBC.    ID:  Cont abx. f/u Cx's.    Lines/Tubes: PIV, PEG.    Endo: Lantus, ISS, wean steroids.    Skin: Skin care.    Dispo: Cont critical care.    CCTime: 40 minutes

## 2017-06-24 NOTE — PROGRESS NOTE ADULT - ATTENDING COMMENTS
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 Worsening anemia today.     -avoid benzos, opioids, other CNS meds  -will do MRI brain on 6/26 if stable off bipap  -decrease dexamethasone to 4 mg q12  -mental status improved, tolerating off bipap, no resp distress, will use bipap prn + qhs  -seen by ENT, recommends trach which the patient has refused in the past  -BP stable, continue metoprolol  -NPO, aspiration precautions, start TF  -decrease ivf, renal fn improving, keep spivey, K normal  -continue vanc, zosyn (increase dose), f/u cx  -continue aspirin  -no active bleeding, monitor H/H  -lantus (may need to increase), ISS  -sc heparin for DVT ppx  -prognosis poor  -cc 36 mins

## 2017-06-24 NOTE — PROGRESS NOTE ADULT - SUBJECTIVE AND OBJECTIVE BOX
Patient is a 57y old  Male who presents with a chief complaint of fever,sob (2017 07:52)    24 hour events: more responsive today    REVIEW OF SYSTEMS: UTO    T(F): 97.4, Max: 99.3 ( @ 11:15)  HR: 72 (61 - 106)  BP: 156/79 (97/59 - 198/93)  RR: 14 (9 - 26)  SpO2: 100% (90% - 100%)    CAPILLARY BLOOD GLUCOSE  234 ( @ 05:22), 263 ( @ 02:00), 319 ( @ 22:00), 324 ( @ 17:42), 354 ( @ 11:57)    I&O's Summary    I & Os for current day (as of  @ 10:10)  =============================================  IN: 2420 ml / OUT: 1930 ml / NET: 490 ml    PHYSICAL EXAM  General: AA male, on bipap, NAD  CNS: more responsive today, responds to voice, follows simple commands, moves all extremities  HEENT: PERRL, dry mucosa  Resp: clear anteriorly, diminished bibasilar, no stridor on bipap  CVS: S1S2, regular  Abd: soft, +PEG, +BS, NT  Ext: chronic skin changes, dark dry scabs all over the legs  Skin: as above    MEDICATIONS  piperacillin/tazobactam IVPB. IV Intermittent  vancomycin  IVPB IV Intermittent  metoprolol Oral  finasteride Oral  dexamethasone  Injectable IV Push  insulin glargine Injectable (LANTUS) SubCutaneous  insulin lispro (HumaLOG) corrective regimen sliding scale SubCutaneous  ALBUTerol    90 MICROgram(s) HFA Inhaler Inhalation PRN  heparin  Injectable SubCutaneous  aspirin enteric coated Oral  pantoprazole   Suspension Oral  sodium chloride 0.45%. IV Continuous                        7.3    17.2  )-----------( 196      ( 2017 06:45 )             22.3     Bands 4.0        145  |  109<H>  |  92<H>  ----------------------------<  222<H>  4.4   |  32<H>  |  3.10<H>    Ca    9.0      2017 06:45  Phos  3.3       Mg     2.9         TPro  7.0  /  Alb  2.1<L>  /  TBili  0.3  /  DBili  x   /  AST  23  /  ALT  29  /  AlkPhos  146<H>      Lactate 1.3            @ 13:57    CARDIAC MARKERS ( 2017 06:45 )  x     / x     / 232 U/L / x     / x        PT/INR - ( 2017 06:45 )   PT: 13.5 sec;   INR: 1.23 ratio       PTT - ( 2017 06:45 )  PTT:29.2 sec  Urinalysis Basic - ( 2017 01:56 )    Color: Yellow / Appearance: Clear / S.015 / pH: x  Gluc: x / Ketone: Negative  / Bili: Negative / Urobili: Negative   Blood: x / Protein: 500 mg/dL / Nitrite: Negative   Leuk Esterase: Negative / RBC: x / WBC 0-2   Sq Epi: x / Non Sq Epi: x / Bacteria: x    .Blood Blood-Peripheral   No growth to date. --  @ 08:47    CENTRAL LINE: N          ANDINO: Y                  DATE INSERTED:              REMOVE: N  A-LINE: N                     GLOBAL ISSUE/BEST PRACTICE  Analgesia: NA  Sedation: NA  CAM-ICU: UTO  HOB elevation: yes  Stress ulcer prophylaxis: NA  VTE prophylaxis: Y  Glycemic control: Y  Nutrition: Y    CODE STATUS: DNR, DNI  GOC discussion: Y

## 2017-06-24 NOTE — PROGRESS NOTE ADULT - SUBJECTIVE AND OBJECTIVE BOX
Patient is a 57y Male with past medical history of           presenting with        who reports no complaints overnight.    REVIEW OF SYSTEMS:    CONSTITUTIONAL: No weakness, fevers or chills  unable to get a good ros     Allergies    No Known Allergies    Intolerances        MEDICATIONS  (STANDING):  finasteride 5milliGRAM(s) Oral daily  aspirin enteric coated 81milliGRAM(s) Oral daily  metoprolol 100milliGRAM(s) Oral every 12 hours  insulin glargine Injectable (LANTUS) 20Unit(s) SubCutaneous at bedtime  insulin lispro (HumaLOG) corrective regimen sliding scale  SubCutaneous every 4 hours  dextrose 5%. 1000milliLiter(s) IV Continuous <Continuous>  dextrose 50% Injectable 12.5Gram(s) IV Push once  dextrose 50% Injectable 25Gram(s) IV Push once  dextrose 50% Injectable 25Gram(s) IV Push once  pantoprazole   Suspension 40milliGRAM(s) Oral before breakfast  heparin  Injectable 5000Unit(s) SubCutaneous every 8 hours  piperacillin/tazobactam IVPB. 3.375Gram(s) IV Intermittent every 8 hours  sodium chloride 0.45%. 1000milliLiter(s) IV Continuous <Continuous>  dexamethasone  Injectable 4milliGRAM(s) IV Push every 12 hours      Vitals:  T(F): 98.8, Max: 99 ( @ 19:09)  HR: 73  BP: 144/71  BP(mean): 101  RR: 28  SpO2: 100%  Wt(kg): --    I and O's:  I & Os for 24h ending  @ 07:00  =============================================  IN: 2420 ml / OUT: 1930 ml / NET: 490 ml    I & Os for current day (as of  @ 18:24)  =============================================  IN: 1450 ml / OUT: 1130 ml / NET: 320 ml          PHYSICAL EXAM:    Constitutional: NAD,   Neck: No JVD  Respiratory: CTAB  Cardiovascular: S1 and S2  Gastrointestinal: BS+, soft, NT/ND  Extremities: No peripheral edema   Skin: No rashes  Dialysis Access: Not applicable    LABS:                        7.3    17.2  )-----------( 196      ( 2017 06:45 )             22.3                         8.0    25.0  )-----------( 197      ( 2017 15:51 )             24.4       145    |  109    |  92     ----------------------------<  222       2017 06:45  4.4     |  32     |  3.10     142    |  107    |  100    ----------------------------<  338       2017 13:57  5.4     |  29     |  3.50     142    |  104    |  112    ----------------------------<  338       2017 01:41  5.5     |  35     |  3.90     Ca    9.0        2017 06:45  Ca    9.4        2017 13:57    Phos  3.3       2017 06:45  Phos  3.4       2017 13:57    Mg     2.9       2017 06:45  Mg     2.9       2017 13:57    TPro  7.0    /  Alb  2.1    /  TBili  0.3    /        2017 06:45  DBili  x      /  AST  23     /  ALT  29     /  AlkPhos  146      TPro  8.0    /  Alb  2.7    /  TBili  0.3    /        2017 01:41  DBili  x      /  AST  19     /  ALT  40     /  AlkPhos  195          Serum Osmo:   Serum Uric Acid:   MATTHEW:   Double Stranded DNA:   C3:   C3 Nephritic Factor:   C4:   p-ANCA:   c-ANCA:   Anti-GBM:   WANDA-Smith Antibody:   Immunofixation:   [place]/Lambda Free Light Chain:   SPEP:   Hepatitis Panel:   HIV-1/2:     Urine Studies:  Urinalysis Basic - ( 2017 01:56 )    Color: Yellow / Appearance: Clear / S.015 / pH: x  Gluc: x / Ketone: Negative  / Bili: Negative / Urobili: Negative   Blood: x / Protein: 500 mg/dL / Nitrite: Negative   Leuk Esterase: Negative / RBC: x / WBC 0-2   Sq Epi: x / Non Sq Epi: x / Bacteria: x        Urine chemistry:   Urine Na: Sodium, Random Urine: <20 mmol/L ( @ 20:29)    Urine Creatinine: Creatinine, Random Urine: 84 mg/dL ( @ 20:29)    Urine Protein/Cr ratio:  Urine K:   Urine Osm:   24 Hr urine studies:     24 hr urine Creatinine Clearance:    RADIOLOGY & ADDITIONAL STUDIES:

## 2017-06-24 NOTE — PROGRESS NOTE ADULT - SUBJECTIVE AND OBJECTIVE BOX
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)  Respiratory failure (J96.90)  Renal failure (N19)  JYOTI on CPAP (G47.33)  Heel ulcer due to DM (E11.621)  Hypertension (I10)  Diabetes mellitus (E11.9)  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)  Pneumonia due to infectious organism, unspecified laterality, unspecified part of lung (J18.9)    HPI:  56 yo AAM ,[facility] resident with hx of DM,OBESITY,JYOTI,HTN,OM,DVT,R heel ulcer,PVD,R tonsillar ca ,brought to ER with fever 101.8 and sob ,he  was diagnosed with acute respiratory failure and found to have stridor and upper airway obstuction ,admitted to ICU ,placed on BIPAP and ENT eval requested 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 He did not undergo any surgery as he was a poor candidate. Thereafter, he was sent to a rehab facility where his status worsened overtime,he became LTC resident and was refusing tracheostomy which was offered few times  For the last few months, his mental and functional status has deteriorated He lost 150 lbs( his weight was 400 lbs when he was diagnosed with ca)  He has not been very responsive for about 10 days . He had a CT neck in May 2017 which showed severe airway obstruction. Patient was told he  needs a tracheostomy since he was diagnosed with SCC last August  He was started on augmentin recently by Dr Kay due to prorbale pneumonia and transferred to ER this morning due to fever and dyspnea Patient is not able to provide any hx in ER due to unresposive state and respiratory distress Admitted to ICU Palliative care consult requested ,to discuss advance directives and complete MOLST ,HCP requests everyhtning to be done ,but aware of patient's wishes not to have tracheostomy and in agreement with his decision Patient also found to be in ARF and to be hyperglycemic (23 Jun 2017 07:52)      REVIEW OF SYSTEMS:    CONSTITUTIONAL: No fever, weight loss, or fatigue  EYES: No eye pain, visual disturbances, or discharge  ENMT:  No difficulty hearing, tinnitus, vertigo; No sinus or throat pain  NECK: No pain or stiffness  BREASTS: No pain, masses, or nipple discharge  RESPIRATORY: No cough, wheezing, chills or hemoptysis; No shortness of breath  CARDIOVASCULAR: No chest pain, palpitations, dizziness, or leg swelling  GASTROINTESTINAL: No abdominal or epigastric pain. No nausea, vomiting, or hematemesis; No diarrhea or constipation. No melena or hematochezia.  GENITOURINARY: No dysuria, frequency, hematuria, or incontinence  NEUROLOGICAL: No headaches, memory loss, loss of strength, numbness, or tremors  SKIN: No itching, burning, rashes, or lesions   LYMPH NODES: No enlarged glands  ENDOCRINE: No heat or cold intolerance; No hair loss  MUSCULOSKELETAL: No joint pain or swelling; No muscle, back, or extremity pain  PSYCHIATRIC: No depression, anxiety, mood swings, or difficulty sleeping  HEME/LYMPH: No easy bruising, or bleeding gums  ALLERGY AND IMMUNOLOGIC: No hives or eczema    MEDICATIONS  (STANDING):  heparin  Injectable 5000Unit(s) SubCutaneous every 12 hours  piperacillin/tazobactam IVPB. 3.375Gram(s) IV Intermittent every 12 hours  sodium chloride 0.45%. 1000milliLiter(s) IV Continuous <Continuous>  finasteride 5milliGRAM(s) Oral daily  aspirin enteric coated 81milliGRAM(s) Oral daily  metoprolol 100milliGRAM(s) Oral every 12 hours  dexamethasone  Injectable 4milliGRAM(s) IV Push every 6 hours  insulin glargine Injectable (LANTUS) 20Unit(s) SubCutaneous at bedtime  insulin lispro (HumaLOG) corrective regimen sliding scale  SubCutaneous every 4 hours  dextrose 5%. 1000milliLiter(s) IV Continuous <Continuous>  dextrose 50% Injectable 12.5Gram(s) IV Push once  dextrose 50% Injectable 25Gram(s) IV Push once  dextrose 50% Injectable 25Gram(s) IV Push once  pantoprazole   Suspension 40milliGRAM(s) Oral before breakfast  vancomycin  IVPB 1000milliGRAM(s) IV Intermittent once    MEDICATIONS  (PRN):  ALBUTerol    90 MICROgram(s) HFA Inhaler 2Puff(s) Inhalation every 6 hours PRN Shortness of Breath and/or Wheezing  dextrose Gel 1Dose(s) Oral once PRN Blood Glucose LESS THAN 70 milliGRAM(s)/deciliter  glucagon  Injectable 1milliGRAM(s) IntraMuscular once PRN Glucose LESS THAN 70 milligrams/deciliter      ALLERGIES: No Known Allergies      FAMILY HISTORY:  Family history of embolic stroke (Uncle)  Family history of acute myocardial infarction (Uncle)  Family history of hypertension (Grandparent, Uncle)  Family history of diabetes mellitus (DM)      PHYSICAL EXAMINATION:  -----------------------------  T(C): 36.3, Max: 37.4 (06-23 @ 11:15)  HR: 72 (61 - 106)  BP: 156/79 (97/59 - 198/93)  RR: 14 (9 - 26)  SpO2: 100% (90% - 100%)  Wt(kg): --    I & Os for current day (as of 06-24 @ 09:38)  =============================================  IN:    sodium chloride 0.45%.: 2100 ml    Solution: 200 ml    Oral Fluid: 120 ml    Total IN: 2420 ml  ---------------------------------------------  OUT:    Indwelling Catheter - Urethral: 1930 ml    Total OUT: 1930 ml  ---------------------------------------------  Total NET: 490 ml    Height (cm): 185.4 (06-23 @ 11:15)  Weight (kg): 106.3 (06-23 @ 11:15)  BMI (kg/m2): 30.9 (06-23 @ 11:15)  BSA (m2): 2.3 (06-23 @ 11:15)    Constitutional: well developed, normal appearance, well groomed, well nourished, no deformities and no acute distress.   Eyes: the conjunctiva exhibited no abnormalities and the eyelids demonstrated no xanthelasmas.   HEENT: normal oral mucosa, no oral pallor and no oral cyanosis.   Neck: normal jugular venous A waves present, normal jugular venous V waves present and no jugular venous marrufo A waves.   Pulmonary: no respiratory distress, normal respiratory rhythm and effort, no accessory muscle use and lungs were clear to auscultation bilaterally.   Cardiovascular: heart rate and rhythm were normal, normal S1 and S2 and no murmur, gallop, rub, heave or thrill are present.   Abdomen: soft, non-tender, no hepato-splenomegaly and no abdominal mass palpated.   Musculoskeletal: the gait could not be assessed..   Extremities: no clubbing of the fingernails, no localized cyanosis, no petechial hemorrhages and no ischemic changes.   Skin: normal skin color and pigmentation, no rash, no venous stasis, no skin lesions, no skin ulcer and no xanthoma was observed.   Psychiatric: oriented to person, place, and time, the affect was normal, the mood was normal and not feeling anxious.     LABS:   --------  06-24    145  |  109<H>  |  92<H>  ----------------------------<  222<H>  4.4   |  32<H>  |  3.10<H>    Ca    9.0      24 Jun 2017 06:45  Phos  3.3     06-24  Mg     2.9     06-24    TPro  7.0  /  Alb  2.1<L>  /  TBili  0.3  /  DBili  x   /  AST  23  /  ALT  29  /  AlkPhos  146<H>  06-24                         7.3    17.2  )-----------( 196      ( 24 Jun 2017 06:45 )             22.3     PT/INR - ( 24 Jun 2017 06:45 )   PT: 13.5 sec;   INR: 1.23 ratio         PTT - ( 24 Jun 2017 06:45 )  PTT:29.2 sec        Culture Results:   No growth to date. (06-23 @ 08:47)  Culture Results:   No growth to date. (06-23 @ 08:47)      RADIOLOGY:  -----------------        ECG:

## 2017-06-24 NOTE — PROGRESS NOTE ADULT - ASSESSMENT
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 He did not undergo any surgery as he was a poor candidate. Thereafter, he was sent to a rehab facility where his status worsened overtime,he became LTC resident and was refusing tracheostomy which was offered few times  For the last few months, his mental and functional status has deteriorated He lost 150 lbs( his weight was 400 lbs when he was diagnosed with ca)  He has not been very responsive for about 10 days . He had a CT neck in May 2017 which showed severe airway obstruction. Patient was told he  needs a tracheostomy since he was diagnosed with SCC last August  He was started on augmentin recently by Dr Kay due to prorbale pneumonia and transferred to ER this morning due to fever and dyspnea Patient is not able to provide any hx in ER due to unresposive state and respiratory distress Admitted to ICU Palliative care consult requested ,to discuss advance directives and complete MOLST ,HCP requests everyhtning to be done ,but aware of patient's wishes not to have tracheostomy and in agreement with his decision Patient also found to be in ARF and to be hyperglycemic

## 2017-06-24 NOTE — PROGRESS NOTE ADULT - SUBJECTIVE AND OBJECTIVE BOX
Patient is a 57y old  Male who presents with a chief complaint of fever,sob (2017 07:52)      INTERVAL HPI/OVERNIGHT EVENTS: Patient seen and examined. NAD. No complaints.      Vital Signs Last 24 Hrs  T(C): 36.6, Max: 37.2 ( @ 19:09)  T(F): 97.9, Max: 99 ( @ 19:09)  HR: 73 (61 - 100)  BP: 158/70 (97/59 - 166/83)  BP(mean): 101 (68 - 116)  RR: 9 (9 - 31)  SpO2: 100% (90% - 100%)I&O's Summary  I & Os for 24h ending 2017 07:00  =============================================  IN: 2420 ml / OUT: 1930 ml / NET: 490 ml    I & Os for current day (as of 2017 16:50)  =============================================  IN: 960 ml / OUT: 430 ml / NET: 530 ml      LABS:                        7.3    17.2  )-----------( 196      ( 2017 06:45 )             22.3     06-24    145  |  109<H>  |  92<H>  ----------------------------<  222<H>  4.4   |  32<H>  |  3.10<H>    Ca    9.0      2017 06:45  Phos  3.3     06-24  Mg     2.9     06-24    TPro  7.0  /  Alb  2.1<L>  /  TBili  0.3  /  DBili  x   /  AST  23  /  ALT  29  /  AlkPhos  146<H>  06-24    PT/INR - ( 2017 06:45 )   PT: 13.5 sec;   INR: 1.23 ratio         PTT - ( 2017 06:45 )  PTT:29.2 sec  Urinalysis Basic - ( 2017 01:56 )    Color: Yellow / Appearance: Clear / S.015 / pH: x  Gluc: x / Ketone: Negative  / Bili: Negative / Urobili: Negative   Blood: x / Protein: 500 mg/dL / Nitrite: Negative   Leuk Esterase: Negative / RBC: x / WBC 0-2   Sq Epi: x / Non Sq Epi: x / Bacteria: x      CAPILLARY BLOOD GLUCOSE  234 (2017 05:22)  263 (2017 02:00)  319 (2017 22:00)  324 (2017 17:42)    blood culture --   @ 08:49     urine culture --   @ 08:49  results   No growth  blood culture --   @ 08:47     urine culture --   @ 08:47  results   No growth to date.        finasteride 5milliGRAM(s) Oral daily  aspirin enteric coated 81milliGRAM(s) Oral daily  metoprolol 100milliGRAM(s) Oral every 12 hours  ALBUTerol    90 MICROgram(s) HFA Inhaler 2Puff(s) Inhalation every 6 hours PRN  insulin glargine Injectable (LANTUS) 20Unit(s) SubCutaneous at bedtime  insulin lispro (HumaLOG) corrective regimen sliding scale  SubCutaneous every 4 hours  dextrose 5%. 1000milliLiter(s) IV Continuous <Continuous>  dextrose Gel 1Dose(s) Oral once PRN  dextrose 50% Injectable 12.5Gram(s) IV Push once  dextrose 50% Injectable 25Gram(s) IV Push once  dextrose 50% Injectable 25Gram(s) IV Push once  glucagon  Injectable 1milliGRAM(s) IntraMuscular once PRN  pantoprazole   Suspension 40milliGRAM(s) Oral before breakfast  heparin  Injectable 5000Unit(s) SubCutaneous every 8 hours  piperacillin/tazobactam IVPB. 3.375Gram(s) IV Intermittent every 8 hours  sodium chloride 0.45%. 1000milliLiter(s) IV Continuous <Continuous>  dexamethasone  Injectable 4milliGRAM(s) IV Push every 12 hours      REVIEW OF SYSTEMS: unobtainable -- pt on BIPAP    Consultant(s) Notes Reviewed:  [x ] YES  [ ] NO    PHYSICAL EXAM:  GENERAL: NAD, well-groomed, well-developed  HEAD:  Atraumatic, Normocephalic  EYES: EOMI, PERRLA, conjunctiva and sclera clear  NECK: Supple, No JVD  CHEST/LUNG: Clear to auscultation bilaterally; decreased BS bases  HEART: Regular rate and rhythm  ABDOMEN: Soft, Nontender, Nondistended; Bowel sounds present  EXTREMITIES:  No clubbing, cyanosis, or edema  LYMPH: No lymphadenopathy noted  SKIN: No rashes    Care Discussed with Consultants/Other Providers [ ] YES  [ ] NO    Advanced care planning discussed with patient/family [ ] YES   [ ] NO

## 2017-06-24 NOTE — PROGRESS NOTE ADULT - PROBLEM SELECTOR PLAN 1
BIPAP ,ABG monitoring ,pulm and icu attending followup Patient requires tracheostomy ,but he refuses it since august 2016  pt is DNR

## 2017-06-25 LAB
ALBUMIN SERPL ELPH-MCNC: 2.2 G/DL — LOW (ref 3.3–5)
ALP SERPL-CCNC: 160 U/L — HIGH (ref 40–120)
ALT FLD-CCNC: 36 U/L — SIGNIFICANT CHANGE UP (ref 12–78)
ANION GAP SERPL CALC-SCNC: 5 MMOL/L — SIGNIFICANT CHANGE UP (ref 5–17)
AST SERPL-CCNC: 31 U/L — SIGNIFICANT CHANGE UP (ref 15–37)
BASOPHILS # BLD AUTO: 0 K/UL — SIGNIFICANT CHANGE UP (ref 0–0.2)
BASOPHILS NFR BLD AUTO: 0.2 % — SIGNIFICANT CHANGE UP (ref 0–2)
BILIRUB SERPL-MCNC: 0.2 MG/DL — SIGNIFICANT CHANGE UP (ref 0.2–1.2)
BUN SERPL-MCNC: 85 MG/DL — HIGH (ref 7–23)
CALCIUM SERPL-MCNC: 8.9 MG/DL — SIGNIFICANT CHANGE UP (ref 8.5–10.1)
CHLORIDE SERPL-SCNC: 110 MMOL/L — HIGH (ref 96–108)
CK SERPL-CCNC: 176 U/L — SIGNIFICANT CHANGE UP (ref 26–308)
CO2 SERPL-SCNC: 31 MMOL/L — SIGNIFICANT CHANGE UP (ref 22–31)
CREAT SERPL-MCNC: 2.7 MG/DL — HIGH (ref 0.5–1.3)
EOSINOPHIL # BLD AUTO: 0 K/UL — SIGNIFICANT CHANGE UP (ref 0–0.5)
EOSINOPHIL NFR BLD AUTO: 0.2 % — SIGNIFICANT CHANGE UP (ref 0–6)
GLUCOSE SERPL-MCNC: 166 MG/DL — HIGH (ref 70–99)
HCT VFR BLD CALC: 24.2 % — LOW (ref 39–50)
HGB BLD-MCNC: 7.9 G/DL — LOW (ref 13–17)
LYMPHOCYTES # BLD AUTO: 1.8 K/UL — SIGNIFICANT CHANGE UP (ref 1–3.3)
LYMPHOCYTES # BLD AUTO: 11 % — LOW (ref 13–44)
MAGNESIUM SERPL-MCNC: 2.9 MG/DL — HIGH (ref 1.6–2.6)
MCHC RBC-ENTMCNC: 32.5 GM/DL — SIGNIFICANT CHANGE UP (ref 32–36)
MCHC RBC-ENTMCNC: 34.8 PG — HIGH (ref 27–34)
MCV RBC AUTO: 107.4 FL — HIGH (ref 80–100)
MONOCYTES # BLD AUTO: 0.9 K/UL — SIGNIFICANT CHANGE UP (ref 0–0.9)
MONOCYTES NFR BLD AUTO: 5.6 % — SIGNIFICANT CHANGE UP (ref 1–9)
NEUTROPHILS # BLD AUTO: 13.4 K/UL — HIGH (ref 1.8–7.4)
NEUTROPHILS NFR BLD AUTO: 83 % — HIGH (ref 43–77)
PHOSPHATE SERPL-MCNC: 2.8 MG/DL — SIGNIFICANT CHANGE UP (ref 2.5–4.5)
PLATELET # BLD AUTO: 259 K/UL — SIGNIFICANT CHANGE UP (ref 150–400)
POTASSIUM SERPL-MCNC: 4.3 MMOL/L — SIGNIFICANT CHANGE UP (ref 3.5–5.3)
POTASSIUM SERPL-SCNC: 4.3 MMOL/L — SIGNIFICANT CHANGE UP (ref 3.5–5.3)
PROT SERPL-MCNC: 7.3 G/DL — SIGNIFICANT CHANGE UP (ref 6–8.3)
RBC # BLD: 2.26 M/UL — LOW (ref 4.2–5.8)
RBC # FLD: 12.3 % — SIGNIFICANT CHANGE UP (ref 10.3–14.5)
SODIUM SERPL-SCNC: 146 MMOL/L — HIGH (ref 135–145)
VANCOMYCIN TROUGH SERPL-MCNC: 15.1 UG/ML — SIGNIFICANT CHANGE UP (ref 10–20)
WBC # BLD: 16.1 K/UL — HIGH (ref 3.8–10.5)
WBC # FLD AUTO: 16.1 K/UL — HIGH (ref 3.8–10.5)

## 2017-06-25 PROCEDURE — 99233 SBSQ HOSP IP/OBS HIGH 50: CPT

## 2017-06-25 RX ORDER — INSULIN LISPRO 100/ML
VIAL (ML) SUBCUTANEOUS EVERY 6 HOURS
Qty: 0 | Refills: 0 | Status: DISCONTINUED | OUTPATIENT
Start: 2017-06-25 | End: 2017-06-28

## 2017-06-25 RX ORDER — DEXTROSE 50 % IN WATER 50 %
1 SYRINGE (ML) INTRAVENOUS ONCE
Qty: 0 | Refills: 0 | Status: DISCONTINUED | OUTPATIENT
Start: 2017-06-25 | End: 2017-06-28

## 2017-06-25 RX ORDER — GLUCAGON INJECTION, SOLUTION 0.5 MG/.1ML
1 INJECTION, SOLUTION SUBCUTANEOUS ONCE
Qty: 0 | Refills: 0 | Status: DISCONTINUED | OUTPATIENT
Start: 2017-06-25 | End: 2017-06-28

## 2017-06-25 RX ORDER — DEXTROSE 50 % IN WATER 50 %
25 SYRINGE (ML) INTRAVENOUS ONCE
Qty: 0 | Refills: 0 | Status: DISCONTINUED | OUTPATIENT
Start: 2017-06-25 | End: 2017-06-28

## 2017-06-25 RX ORDER — DEXTROSE 50 % IN WATER 50 %
12.5 SYRINGE (ML) INTRAVENOUS ONCE
Qty: 0 | Refills: 0 | Status: DISCONTINUED | OUTPATIENT
Start: 2017-06-25 | End: 2017-06-28

## 2017-06-25 RX ORDER — DEXAMETHASONE 0.5 MG/5ML
2 ELIXIR ORAL EVERY 12 HOURS
Qty: 0 | Refills: 0 | Status: DISCONTINUED | OUTPATIENT
Start: 2017-06-25 | End: 2017-06-28

## 2017-06-25 RX ORDER — INSULIN GLARGINE 100 [IU]/ML
15 INJECTION, SOLUTION SUBCUTANEOUS AT BEDTIME
Qty: 0 | Refills: 0 | Status: DISCONTINUED | OUTPATIENT
Start: 2017-06-25 | End: 2017-06-28

## 2017-06-25 RX ORDER — SODIUM CHLORIDE 9 MG/ML
1000 INJECTION, SOLUTION INTRAVENOUS
Qty: 0 | Refills: 0 | Status: DISCONTINUED | OUTPATIENT
Start: 2017-06-25 | End: 2017-06-28

## 2017-06-25 RX ADMIN — Medication 2 MILLIGRAM(S): at 17:11

## 2017-06-25 RX ADMIN — Medication 4 MILLIGRAM(S): at 06:27

## 2017-06-25 RX ADMIN — HEPARIN SODIUM 5000 UNIT(S): 5000 INJECTION INTRAVENOUS; SUBCUTANEOUS at 22:09

## 2017-06-25 RX ADMIN — Medication 81 MILLIGRAM(S): at 12:00

## 2017-06-25 RX ADMIN — HEPARIN SODIUM 5000 UNIT(S): 5000 INJECTION INTRAVENOUS; SUBCUTANEOUS at 06:27

## 2017-06-25 RX ADMIN — PIPERACILLIN AND TAZOBACTAM 25 GRAM(S): 4; .5 INJECTION, POWDER, LYOPHILIZED, FOR SOLUTION INTRAVENOUS at 06:30

## 2017-06-25 RX ADMIN — INSULIN GLARGINE 15 UNIT(S): 100 INJECTION, SOLUTION SUBCUTANEOUS at 22:47

## 2017-06-25 RX ADMIN — PIPERACILLIN AND TAZOBACTAM 25 GRAM(S): 4; .5 INJECTION, POWDER, LYOPHILIZED, FOR SOLUTION INTRAVENOUS at 14:39

## 2017-06-25 RX ADMIN — Medication 1: at 12:08

## 2017-06-25 RX ADMIN — Medication 2: at 17:12

## 2017-06-25 RX ADMIN — Medication 2: at 10:05

## 2017-06-25 RX ADMIN — Medication 2: at 03:44

## 2017-06-25 RX ADMIN — PIPERACILLIN AND TAZOBACTAM 25 GRAM(S): 4; .5 INJECTION, POWDER, LYOPHILIZED, FOR SOLUTION INTRAVENOUS at 22:10

## 2017-06-25 RX ADMIN — PANTOPRAZOLE SODIUM 40 MILLIGRAM(S): 20 TABLET, DELAYED RELEASE ORAL at 08:07

## 2017-06-25 RX ADMIN — HEPARIN SODIUM 5000 UNIT(S): 5000 INJECTION INTRAVENOUS; SUBCUTANEOUS at 14:38

## 2017-06-25 RX ADMIN — Medication 100 MILLIGRAM(S): at 17:12

## 2017-06-25 RX ADMIN — FINASTERIDE 5 MILLIGRAM(S): 5 TABLET, FILM COATED ORAL at 12:00

## 2017-06-25 RX ADMIN — Medication 100 MILLIGRAM(S): at 06:29

## 2017-06-25 NOTE — PROGRESS NOTE ADULT - ASSESSMENT
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 He did not undergo any surgery as he was a poor candidate. Thereafter, he was sent to a rehab facility where his status worsened overtime,he became LTC resident and was refusing tracheostomy which was offered few times  For the last few months, his mental and functional status has deteriorated He lost 150 lbs( his weight was 400 lbs when he was diagnosed with ca)  He has not been very responsive for about 10 days . He had a CT neck in May 2017 which showed severe airway obstruction. Patient was told he  needs a tracheostomy since he was diagnosed with SCC last August  He was started on augmentin recently by Dr Kay due to prorbale pneumonia and transferred to ER this morning due to fever and dyspnea Patient is not able to provide any hx in ER due to unresposive state and respiratory distress Admitted to ICU Palliative care consult requested ,to discuss advance directives and complete MOLST ,HCP requests everyhtning to be done ,but aware of patient's wishes not to have tracheostomy and in agreement with his decision Patient also found to be in ARF and to be hyperglycemic

## 2017-06-25 NOTE — PROGRESS NOTE ADULT - SUBJECTIVE AND OBJECTIVE BOX
Patient is a 57y old  Male who presents with a chief complaint of fever,sob (23 Jun 2017 07:52)    24 hour events: ***    REVIEW OF SYSTEMS  Constitutional: No fever, chills, fatigue  Neuro: No headache, numbness, weakness  Resp: No cough, wheezing, shortness of breath  CVS: No chest pain, palpitations, leg swelling  GI: No abdominal pain, nausea, vomiting, diarrhea   : No dysuria, frequency, incontinence  Skin: No itching, burning, rashes, or lesions   Msk: No joint pain or swelling  Psych: No depression, anxiety, mood swings    T(F): 97.7, Max: 98.8 (06-24 @ 18:00)  HR: 64 (61 - 79)  BP: 128/72 (122/66 - 172/79)  RR: 10 (8 - 31)  SpO2: 100% (99% - 100%)    CAPILLARY BLOOD GLUCOSE  150 (06-25 @ 07:00), 183 (06-25 @ 03:00), 223 (06-24 @ 22:00), 251 (06-24 @ 18:00), 262 (06-24 @ 14:00), 237 (06-24 @ 10:00)    I&O's Summary    I & Os for current day (as of 06-25 @ 07:59)  =============================================  IN: 3135 ml / OUT: 2580 ml / NET: 555 ml    PHYSICAL EXAM  General: AA male, on bipap, NAD  CNS: more responsive today, responds to voice, follows simple commands, moves all extremities  HEENT: PERRL, dry mucosa  Resp: clear anteriorly, diminished bibasilar, no stridor on bipap  CVS: S1S2, regular  Abd: soft, +PEG, +BS, NT  Ext: chronic skin changes, dark dry scabs all over the legs  Skin: as above    MEDICATIONS  piperacillin/tazobactam IVPB. IV Intermittent  metoprolol Oral  finasteride Oral  insulin glargine Injectable (LANTUS) SubCutaneous  insulin lispro (HumaLOG) corrective regimen sliding scale SubCutaneous  dexamethasone  Injectable IV Push  ALBUTerol    90 MICROgram(s) HFA Inhaler Inhalation PRN  aspirin enteric coated Oral  heparin  Injectable SubCutaneous  pantoprazole   Suspension Oral                        7.9    16.1  )-----------( 259      ( 25 Jun 2017 05:48 )             24.2     06-25    146<H>  |  110<H>  |  85<H>  ----------------------------<  166<H>  4.3   |  31  |  2.70<H>    Ca    8.9      25 Jun 2017 05:48  Phos  2.8     06-25  Mg     2.9     06-25    TPro  7.3  /  Alb  2.2<L>  /  TBili  0.2  /  DBili  x   /  AST  31  /  ALT  36  /  AlkPhos  160<H>  06-25    CARDIAC MARKERS ( 25 Jun 2017 05:48 )  x     / x     / 176 U/L / x     / x      CARDIAC MARKERS ( 24 Jun 2017 06:45 )  x     / x     / 232 U/L / x     / x        PT/INR - ( 24 Jun 2017 06:45 )   PT: 13.5 sec;   INR: 1.23 ratio       PTT - ( 24 Jun 2017 06:45 )  PTT:29.2 sec    .Urine Catheterized   No growth -- 06-23 @ 18:58  .Urine Catheterized   No growth -- 06-23 @ 08:49  .Blood Blood-Peripheral   No growth to date. -- 06-23 @ 08:47    CENTRAL LINE: N           ANDINO: Y                             REMOVE: Y/N***  A-LINE: N                        GLOBAL ISSUE/BEST PRACTICE  Analgesia: NA  Sedation: NA  CAM-ICU: UTO  HOB elevation: yes  Stress ulcer prophylaxis: NA  VTE prophylaxis: Y  Glycemic control: Y  Nutrition: Y    CODE STATUS: DNR, DNI  GOC discussion: Y Patient is a 57y old  Male who presents with a chief complaint of fever,sob (23 Jun 2017 07:52)    24 hour events: no new events, doing well    REVIEW OF SYSTEMS  Constitutional: No fever, chills, fatigue  Neuro: No headache, numbness, weakness  Resp: No cough, wheezing, shortness of breath  CVS: No chest pain, palpitations, leg swelling  GI: No abdominal pain, nausea, vomiting, diarrhea   : No dysuria, frequency, incontinence  Skin: No itching, burning  Msk: No joint pain or swelling  Psych: No depression, anxiety, mood swings    T(F): 97.7, Max: 98.8 (06-24 @ 18:00)  HR: 64 (61 - 79)  BP: 128/72 (122/66 - 172/79)  RR: 10 (8 - 31)  SpO2: 100% (99% - 100%)    CAPILLARY BLOOD GLUCOSE  150 (06-25 @ 07:00), 183 (06-25 @ 03:00), 223 (06-24 @ 22:00), 251 (06-24 @ 18:00), 262 (06-24 @ 14:00), 237 (06-24 @ 10:00)    I&O's Summary    I & Os for current day (as of 06-25 @ 07:59)  =============================================  IN: 3135 ml / OUT: 2580 ml / NET: 555 ml    PHYSICAL EXAM  General: AA male, NAD  CNS: AAO x 1, moves all extremities, follows commands, confused  HEENT: PERRL, dry mucosa  Resp: clear anteriorly, diminished bibasilar, no stridor   CVS: S1S2, regular  Abd: soft, +PEG, +BS, NT  Ext: chronic skin changes, dark dry scabs all over the legs  Skin: as above    MEDICATIONS  piperacillin/tazobactam IVPB. IV Intermittent  metoprolol Oral  finasteride Oral  insulin glargine Injectable (LANTUS) SubCutaneous  insulin lispro (HumaLOG) corrective regimen sliding scale SubCutaneous  dexamethasone  Injectable IV Push  ALBUTerol    90 MICROgram(s) HFA Inhaler Inhalation PRN  aspirin enteric coated Oral  heparin  Injectable SubCutaneous  pantoprazole   Suspension Oral                        7.9    16.1  )-----------( 259      ( 25 Jun 2017 05:48 )             24.2     06-25    146<H>  |  110<H>  |  85<H>  ----------------------------<  166<H>  4.3   |  31  |  2.70<H>    Ca    8.9      25 Jun 2017 05:48  Phos  2.8     06-25  Mg     2.9     06-25    TPro  7.3  /  Alb  2.2<L>  /  TBili  0.2  /  DBili  x   /  AST  31  /  ALT  36  /  AlkPhos  160<H>  06-25    CARDIAC MARKERS ( 25 Jun 2017 05:48 )  x     / x     / 176 U/L / x     / x      CARDIAC MARKERS ( 24 Jun 2017 06:45 )  x     / x     / 232 U/L / x     / x        PT/INR - ( 24 Jun 2017 06:45 )   PT: 13.5 sec;   INR: 1.23 ratio       PTT - ( 24 Jun 2017 06:45 )  PTT:29.2 sec    .Urine Catheterized   No growth -- 06-23 @ 18:58  .Urine Catheterized   No growth -- 06-23 @ 08:49  .Blood Blood-Peripheral   No growth to date. -- 06-23 @ 08:47    CENTRAL LINE: N           ANDINO: Y                             REMOVE: Y/N***  A-LINE: N                        GLOBAL ISSUE/BEST PRACTICE  Analgesia: NA  Sedation: NA  CAM-ICU: neg  HOB elevation: yes  Stress ulcer prophylaxis: NA  VTE prophylaxis: Y  Glycemic control: Y  Nutrition: Y    CODE STATUS: DNR, DNI  GO discussion: Y Patient is a 57y old  Male who presents with a chief complaint of fever,sob (23 Jun 2017 07:52)    24 hour events: no new events, doing well    REVIEW OF SYSTEMS  Constitutional: No fever, chills, fatigue  Neuro: No headache, numbness, weakness  Resp: No cough, wheezing, shortness of breath  CVS: No chest pain, palpitations, leg swelling  GI: No abdominal pain, nausea, vomiting, diarrhea   : No dysuria, frequency, incontinence  Skin: No itching, burning  Msk: No joint pain or swelling  Psych: No depression, anxiety, mood swings    T(F): 97.7, Max: 98.8 (06-24 @ 18:00)  HR: 64 (61 - 79)  BP: 128/72 (122/66 - 172/79)  RR: 10 (8 - 31)  SpO2: 100% (99% - 100%)    CAPILLARY BLOOD GLUCOSE  150 (06-25 @ 07:00), 183 (06-25 @ 03:00), 223 (06-24 @ 22:00), 251 (06-24 @ 18:00), 262 (06-24 @ 14:00), 237 (06-24 @ 10:00)    I&O's Summary    I & Os for current day (as of 06-25 @ 07:59)  =============================================  IN: 3135 ml / OUT: 2580 ml / NET: 555 ml    PHYSICAL EXAM  General: AA male, NAD  CNS: AAO x 1, moves all extremities, follows commands, confused  HEENT: PERRL, dry mucosa  Resp: clear anteriorly, diminished bibasilar, no stridor   CVS: S1S2, regular  Abd: soft, +PEG, +BS, NT  Ext: chronic skin changes, dark dry scabs all over the legs  Skin: as above    MEDICATIONS  piperacillin/tazobactam IVPB. IV Intermittent  metoprolol Oral  finasteride Oral  insulin glargine Injectable (LANTUS) SubCutaneous  insulin lispro (HumaLOG) corrective regimen sliding scale SubCutaneous  dexamethasone  Injectable IV Push  ALBUTerol    90 MICROgram(s) HFA Inhaler Inhalation PRN  aspirin enteric coated Oral  heparin  Injectable SubCutaneous  pantoprazole   Suspension Oral                        7.9    16.1  )-----------( 259      ( 25 Jun 2017 05:48 )             24.2     06-25    146<H>  |  110<H>  |  85<H>  ----------------------------<  166<H>  4.3   |  31  |  2.70<H>    Ca    8.9      25 Jun 2017 05:48  Phos  2.8     06-25  Mg     2.9     06-25    TPro  7.3  /  Alb  2.2<L>  /  TBili  0.2  /  DBili  x   /  AST  31  /  ALT  36  /  AlkPhos  160<H>  06-25    CARDIAC MARKERS ( 25 Jun 2017 05:48 )  x     / x     / 176 U/L / x     / x      CARDIAC MARKERS ( 24 Jun 2017 06:45 )  x     / x     / 232 U/L / x     / x        PT/INR - ( 24 Jun 2017 06:45 )   PT: 13.5 sec;   INR: 1.23 ratio       PTT - ( 24 Jun 2017 06:45 )  PTT:29.2 sec    .Urine Catheterized   No growth -- 06-23 @ 18:58  .Urine Catheterized   No growth -- 06-23 @ 08:49  .Blood Blood-Peripheral   No growth to date. -- 06-23 @ 08:47    CENTRAL LINE: N           THU: Y                             REMOVE: Y/N***  A-LINE: N                        GLOBAL ISSUE/BEST PRACTICE  Analgesia: NA  Sedation: NA  CAM-ICU: positive  HOB elevation: yes  Stress ulcer prophylaxis: NA  VTE prophylaxis: Y  Glycemic control: Y  Nutrition: Y    CODE STATUS: DNR, DNI  GO discussion: Y

## 2017-06-25 NOTE — PROGRESS NOTE ADULT - SUBJECTIVE AND OBJECTIVE BOX
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)  Respiratory failure (J96.90)  Renal failure (N19)  JYOTI on CPAP (G47.33)  Heel ulcer due to DM (E11.621)  Hypertension (I10)  Diabetes mellitus (E11.9)  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 He did not undergo any surgery as he was a poor candidate. Thereafter, he was sent to a rehab facility where his status worsened overtime,he became LTC resident and was refusing tracheostomy which was offered few times  For the last few months, his mental and functional status has deteriorated He lost 150 lbs( his weight was 400 lbs when he was diagnosed with ca)  He has not been very responsive for about 10 days . He had a CT neck in May 2017 which showed severe airway obstruction. Patient was told he  needs a tracheostomy since he was diagnosed with SCC last August  He was started on augmentin recently by Dr Kay due to prorbale pneumonia and transferred to ER this morning due to fever and dyspnea Patient is not able to provide any hx in ER due to unresposive state and respiratory distress Admitted to ICU Palliative care consult requested ,to discuss advance directives and complete MOLST ,HCP requests everyhtning to be done ,but aware of patient's wishes not to have tracheostomy and in agreement with his decision Patient also found to be in ARF and to be hyperglycemic (23 Jun 2017 07:52)      REVIEW OF SYSTEMS:    CONSTITUTIONAL: No fever, weight loss, or fatigue  EYES: No eye pain, visual disturbances, or discharge  ENMT:  No difficulty hearing, tinnitus, vertigo; No sinus or throat pain  NECK: No pain or stiffness  BREASTS: No pain, masses, or nipple discharge  RESPIRATORY: No cough, wheezing, chills or hemoptysis; No shortness of breath  CARDIOVASCULAR: No chest pain, palpitations, dizziness, or leg swelling  GASTROINTESTINAL: No abdominal or epigastric pain. No nausea, vomiting, or hematemesis; No diarrhea or constipation. No melena or hematochezia.  GENITOURINARY: No dysuria, frequency, hematuria, or incontinence  NEUROLOGICAL: No headaches, memory loss, loss of strength, numbness, or tremors  SKIN: No itching, burning, rashes, or lesions   LYMPH NODES: No enlarged glands  ENDOCRINE: No heat or cold intolerance; No hair loss  MUSCULOSKELETAL: No joint pain or swelling; No muscle, back, or extremity pain  PSYCHIATRIC: No depression, anxiety, mood swings, or difficulty sleeping  HEME/LYMPH: No easy bruising, or bleeding gums  ALLERGY AND IMMUNOLOGIC: No hives or eczema    MEDICATIONS  (STANDING):  finasteride 5milliGRAM(s) Oral daily  aspirin enteric coated 81milliGRAM(s) Oral daily  metoprolol 100milliGRAM(s) Oral every 12 hours  pantoprazole   Suspension 40milliGRAM(s) Oral before breakfast  heparin  Injectable 5000Unit(s) SubCutaneous every 8 hours  piperacillin/tazobactam IVPB. 3.375Gram(s) IV Intermittent every 8 hours    MEDICATIONS  (PRN):  ALBUTerol    90 MICROgram(s) HFA Inhaler 2Puff(s) Inhalation every 6 hours PRN Shortness of Breath and/or Wheezing      ALLERGIES: No Known Allergies      FAMILY HISTORY:  Family history of embolic stroke (Uncle)  Family history of acute myocardial infarction (Uncle)  Family history of hypertension (Grandparent, Uncle)  Family history of diabetes mellitus (DM)      PHYSICAL EXAMINATION:  -----------------------------  T(C): 36.4, Max: 37.1 (06-24 @ 18:00)  HR: 66 (61 - 79)  BP: 130/59 (122/66 - 172/79)  RR: 13 (8 - 31)  SpO2: 97% (94% - 100%)  Wt(kg): --  I & Os for 24h ending 06-25 @ 07:00  =============================================  IN:    sodium chloride 0.45%: 1425 ml    Glucerna: 660 ml    sodium chloride 0.45%: 500 ml    Solution: 300 ml    Solution: 250 ml    Total IN: 3135 ml  ---------------------------------------------  OUT:    Indwelling Catheter - Urethral: 2530 ml    Nasoenteral Tube: 50 ml    Total OUT: 2580 ml  ---------------------------------------------  Total NET: 555 ml    I & Os for current day (as of 06-25 @ 10:13)  =============================================  IN:    Glucerna: 150 ml    Oral Fluid: 50 ml    Total IN: 200 ml  ---------------------------------------------  OUT:    Indwelling Catheter - Urethral: 380 ml    Total OUT: 380 ml  ---------------------------------------------  Total NET: -180 ml        Constitutional: well developed, normal appearance, well groomed, well nourished, no deformities and no acute distress.   Eyes: the conjunctiva exhibited no abnormalities and the eyelids demonstrated no xanthelasmas.   HEENT: normal oral mucosa, no oral pallor and no oral cyanosis.   Neck: normal jugular venous A waves present, normal jugular venous V waves present and no jugular venous marrufo A waves.   Pulmonary: no respiratory distress, normal respiratory rhythm and effort, no accessory muscle use and lungs were clear to auscultation bilaterally.   Cardiovascular: heart rate and rhythm were normal, normal S1 and S2 and no murmur, gallop, rub, heave or thrill are present.   Abdomen: soft, non-tender, no hepato-splenomegaly and no abdominal mass palpated.   Musculoskeletal: the gait could not be assessed..   Extremities: no clubbing of the fingernails, no localized cyanosis, no petechial hemorrhages and no ischemic changes.   Skin: normal skin color and pigmentation, no rash, no venous stasis, no skin lesions, no skin ulcer and no xanthoma was observed.   Psychiatric: oriented to person, place, and time, the affect was normal, the mood was normal and not feeling anxious.     LABS:   --------  06-25    146<H>  |  110<H>  |  85<H>  ----------------------------<  166<H>  4.3   |  31  |  2.70<H>    Ca    8.9      25 Jun 2017 05:48  Phos  2.8     06-25  Mg     2.9     06-25    TPro  7.3  /  Alb  2.2<L>  /  TBili  0.2  /  DBili  x   /  AST  31  /  ALT  36  /  AlkPhos  160<H>  06-25                         7.9    16.1  )-----------( 259      ( 25 Jun 2017 05:48 )             24.2     PT/INR - ( 24 Jun 2017 06:45 )   PT: 13.5 sec;   INR: 1.23 ratio         PTT - ( 24 Jun 2017 06:45 )  PTT:29.2 sec        Culture Results:   No growth (06-23 @ 18:58)      RADIOLOGY:  -----------------        ECG:

## 2017-06-25 NOTE — PROGRESS NOTE ADULT - PROBLEM SELECTOR PLAN 1
CARMEL (acute kidney injury). Recommendation: ACUTE RENAL FAILURE: due to hypoperfusion sepsis , possible atn   non oliguric atn with good urine out put is improving   continue with iv fluid aggressively    Serum creatinine is more thn 3    , approximating GFR is decreased    ml/min.   [There is no progression]. [No uremic symptoms]   [No evidence of anemia].  Fluid status stable.  Will continue to avoid nephrotoxic drugs.  Patient remains asymptomatic.   Continue current therapy..

## 2017-06-25 NOTE — PROGRESS NOTE ADULT - SUBJECTIVE AND OBJECTIVE BOX
Patient is a 57y old  Male who presents with a chief complaint of fever,sob (23 Jun 2017 07:52)      INTERVAL HPI/OVERNIGHT EVENTS:    Vital Signs Last 24 Hrs  T(C): 36.4, Max: 37.1 (06-24 @ 18:00)  T(F): 97.6, Max: 98.8 (06-24 @ 18:00)  HR: 65 (61 - 79)  BP: 172/68 (122/66 - 172/79)  BP(mean): 98 (88 - 114)  RR: 13 (8 - 31)  SpO2: 94% (94% - 100%)I&O's Summary  I & Os for 24h ending 25 Jun 2017 07:00  =============================================  IN: 3135 ml / OUT: 2580 ml / NET: 555 ml    I & Os for current day (as of 25 Jun 2017 09:48)  =============================================  IN: 200 ml / OUT: 220 ml / NET: -20 ml      LABS:                        7.9    16.1  )-----------( 259      ( 25 Jun 2017 05:48 )             24.2     06-25    146<H>  |  110<H>  |  85<H>  ----------------------------<  166<H>  4.3   |  31  |  2.70<H>    Ca    8.9      25 Jun 2017 05:48  Phos  2.8     06-25  Mg     2.9     06-25    TPro  7.3  /  Alb  2.2<L>  /  TBili  0.2  /  DBili  x   /  AST  31  /  ALT  36  /  AlkPhos  160<H>  06-25    PT/INR - ( 24 Jun 2017 06:45 )   PT: 13.5 sec;   INR: 1.23 ratio         PTT - ( 24 Jun 2017 06:45 )  PTT:29.2 sec    CAPILLARY BLOOD GLUCOSE  150 (25 Jun 2017 07:00)  183 (25 Jun 2017 03:00)  223 (24 Jun 2017 22:00)  251 (24 Jun 2017 18:00)  262 (24 Jun 2017 14:00)  237 (24 Jun 2017 10:00)    blood culture --  06-23 @ 18:58     urine culture --  06-23 @ 18:58  results   No growth  blood culture --  06-23 @ 08:49     urine culture --  06-23 @ 08:49  results   No growth  blood culture --  06-23 @ 08:47     urine culture --  06-23 @ 08:47  results   No growth to date.        finasteride 5milliGRAM(s) Oral daily  aspirin enteric coated 81milliGRAM(s) Oral daily  metoprolol 100milliGRAM(s) Oral every 12 hours  ALBUTerol    90 MICROgram(s) HFA Inhaler 2Puff(s) Inhalation every 6 hours PRN  insulin glargine Injectable (LANTUS) 20Unit(s) SubCutaneous at bedtime  insulin lispro (HumaLOG) corrective regimen sliding scale  SubCutaneous every 4 hours  dextrose 5%. 1000milliLiter(s) IV Continuous <Continuous>  dextrose Gel 1Dose(s) Oral once PRN  dextrose 50% Injectable 12.5Gram(s) IV Push once  dextrose 50% Injectable 25Gram(s) IV Push once  dextrose 50% Injectable 25Gram(s) IV Push once  glucagon  Injectable 1milliGRAM(s) IntraMuscular once PRN  pantoprazole   Suspension 40milliGRAM(s) Oral before breakfast  heparin  Injectable 5000Unit(s) SubCutaneous every 8 hours  piperacillin/tazobactam IVPB. 3.375Gram(s) IV Intermittent every 8 hours  dexamethasone  Injectable 4milliGRAM(s) IV Push every 12 hours      REVIEW OF SYSTEMS:   CONSTITUTIONAL: No fever, weight loss, or fatigue  NECK: No pain or stiffness  RESPIRATORY: + shortness of breath  CARDIOVASCULAR: No chest pain, palpitations, + dizziness,    GASTROINTESTINAL: No abdominal or epigastric pain. No nausea, vomiting, or hematemesis; No diarrhea or constipation. No melena or hematochezia.  GENITOURINARY: No dysuria, frequency, hematuria, or incontinence  NEUROLOGICAL: No headaches, loss of strength, numbness, or tremors  SKIN: No itching, burning  MUSCULOSKELETAL: No joint pain or swelling; No muscle, back, or extremity pain  PSYCHIATRIC: No depression, mood swings, HEME/LYMPH: No easy bruising, or bleeding gums  ALLERY AND IMMUNOLOGIC: No hives     RADIOLOGY & ADDITIONAL TESTS:    Imaging Personally Reviewed:  [ ] YES  [ ] NO    Consultant(s) Notes Reviewed:  [x ] YES  [ ] NO    PHYSICAL EXAM:  GENERAL: NAD, obese  HEAD:  Atraumatic, Normocephalic  EYES: EOMI, PERRLA, conjunctiva and sclera clear  ENMT: No tonsillar erythema, exudates, or enlargement; Moist mucous membranes  NECK: Supple, No JVD  NERVOUS SYSTEM:  slightly lethargic but arousable    CHEST/LUNG: +wheeze  HEART: Regular rate and rhythm  ABDOMEN: Soft, Nontender, Nondistended; Bowel sounds present  EXTREMITIES:  No clubbing, cyanosis, or edema  LYMPH: No lymphadenopathy noted  SKIN: No rashes    Care Discussed with Consultants/Other Providers [ ] YES  [ ] NO    Advanced care planning discussed with patient/family [x ] YES   [ ] NO

## 2017-06-25 NOTE — PROGRESS NOTE ADULT - SUBJECTIVE AND OBJECTIVE BOX
Patient seen and examined today Plan discussed/Questions answered  (with patient/ancillary staff/colleagues) Chart notes reviewd More detailed Pulm/Critical Care notes, assessment and plan  will be added later today as needed after reviewing further labs as they become available     Notable interval events reviewed    ROS/PE  . REVIEW OF SYSTEMS Patient is unable to give any reliable review of systems   PHYSICAL EXAM    HEENT Unremarkable PERRLA atraumatic  RESP Fair air entry EXP prolonged    Harsh breath sound Resp distres mild  CARDIAC S1 S2 No S3     NO JVD   Awake Alert and ORIENTED x on  ABDOMEN SOFT BS PRESENT NOT DISTENDED No hepatosplenomegaly  PEDAL EDEMA present No calf tenderness  NO rash GENERAL Not TOXIC looking  . Patient seen and examined today Plan discussed/Questions answered  (with patient/ancillary staff/colleagues) Chart notes reviewd More detailed Pulm/Critical Care notes, assessment and plan  will be added later today as needed after reviewing further labs as they become available     Notable interval events reviewed    ROS/PE  . REVIEW OF SYSTEMS Patient is unable to give any reliable review of systems   PHYSICAL EXAM    HEENT Unremarkable PERRLA atraumatic  RESP Fair air entry EXP prolonged    Harsh breath sound Resp distres mild  CARDIAC S1 S2 No S3     NO JVD   Awake Alert and ORIENTED x on  ABDOMEN SOFT BS PRESENT NOT DISTENDED No hepatosplenomegaly  PEDAL EDEMA present No calf tenderness  NO rash GENERAL Not TOXIC looking  .  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  JYOTI (on CPAP  14), PVD smoker who presented from [facility] with resp distress and fever T 101 In ER noted to have insp stridor ABG on 100% (9a) 734/54/92 and was admitted to [facility] ICU 6/23/2017 6/25 I dw pt's spouse and she told me pt absolutely declines trach even if it is lifesaving situation Pt is dnr and prognosis is poor He was moved to reg floor 6/25 and plan is for MR brain to look for mets He got noct BPAP 16/8/.4 for JYOTI and for resp failure and was Rxed with zosyn (6/24) for asp pneum and decadron taper for uao and was given Glucerna PEG feeds   VITALS/LABS  6/25/2017 afeb 67 120/60   6/25/2017 W 16.1 Hb 7.9 Plt 259 Na 146 K 4.3 CO2 31 Cr 2.7   PROBLEM ASSESSMENT PLAN   UAO  6/23 Seen by ENT HO SCC R tonsil sp RT (not told well) sp Chemo last 10/2016) Need trach which he has declined   On decadron 4.2 (6/24)   RESP   6/24 6a BPAP 16/8/14/.4 736/52/142   Monitor PO ABG Target PO 90-96%   ASPIRATION PNEUMONIA   6/23-6/24-6/25/2017 W 24.5-17.2-16.1  6/24 pct 1.56 6/23 bc n 6/23 uc n   6/23 LA 1.6-1.1  6/22/2017 CXR lll infiltr  zosyn (6/23) Vanco   VANCO LEVEL MONITORING IN RENAL PATIENT   6/24 Vanco 7.8   COPD   albuterol hfa.4p (6/23)   CAD  ASA 81 (6/23) metoprolol 100.2 (6/23)   CARMEL   8/9/2016-6/23-6/24-6/25/2017 Cr 3.5-3.9-3.1-2.7     6/23 Susan Below 20   1/2 NS 75 (6/24)   ANEMIA LIKELY SEC CKD   8/9/2016-6/23-6/24-6/25/2017  Hb 8.1-8.5-7.3-7.9   OBTUNDATION   mr being planned for 6/26   GLOBAL ISSUE/BEST PRACTICE:        PROBLEM:      Analgesia:     na                        PROBLEM: Sedation:     na               PROBLEM: HOB elevation:   yes             PROBLEM: Stress ulcer proph:    protonix 40 (6/24)                       PROBLEM: VTE prophylaxis:      hsc (6/23)                        PROBLEM: Glycemic control:    iss (6/23)             PROBLEM: Nutrition:    Glucerna 1.2 1680 (6/24)                       PROBLEM: Advanced directive: dnr     PROBLEM: Allergies:  na  TIME SPENT Over 25 minutes aggregate time spent on encounter; activities included   direct patient care, counseling and/or coordinating care reviewing notes, lab data/ imaging , discussion with multidisciplinary team/ patient  /family. Risks, benefits, alternatives  discussed in detail. Questions/concerns  were addressed  to the best of my ability .

## 2017-06-25 NOTE — PROGRESS NOTE ADULT - SUBJECTIVE AND OBJECTIVE BOX
Patient is a 57y Male with past medical history of      ckd      presenting with   zacarias      who reports no complaints overnight.    REVIEW OF SYSTEMS:    CONSTITUTIONAL: No weakness, fevers or chills    Allergies    No Known Allergies    Intolerances        MEDICATIONS  (STANDING):  finasteride 5milliGRAM(s) Oral daily  aspirin enteric coated 81milliGRAM(s) Oral daily  metoprolol 100milliGRAM(s) Oral every 12 hours  pantoprazole   Suspension 40milliGRAM(s) Oral before breakfast  heparin  Injectable 5000Unit(s) SubCutaneous every 8 hours  piperacillin/tazobactam IVPB. 3.375Gram(s) IV Intermittent every 8 hours  dexamethasone    Solution 2milliGRAM(s) Oral every 12 hours  insulin glargine Injectable (LANTUS) 15Unit(s) SubCutaneous at bedtime  insulin lispro (HumaLOG) corrective regimen sliding scale  SubCutaneous every 6 hours  dextrose 5%. 1000milliLiter(s) IV Continuous <Continuous>  dextrose 50% Injectable 12.5Gram(s) IV Push once  dextrose 50% Injectable 25Gram(s) IV Push once  dextrose 50% Injectable 25Gram(s) IV Push once      Vitals:  T(F): 98.2, Max: 98.2 (06-24 @ 20:10)  HR: 69  BP: 122/74  BP(mean): 94  RR: 13  SpO2: 99%  Wt(kg): --    I and O's:  I & Os for 24h ending 06-25 @ 07:00  =============================================  IN: 3135 ml / OUT: 2580 ml / NET: 555 ml    I & Os for current day (as of 06-25 @ 18:25)  =============================================  IN: 800 ml / OUT: 1080 ml / NET: -280 ml          PHYSICAL EXAM:    Constitutional: NAD,   Neck: No JVD  Respiratory: CTAB  Cardiovascular: S1 and S2  Gastrointestinal: BS+, soft, NT/ND  Extremities: No peripheral edema  Neurological: , no focal deficits    LABS:                        7.9    16.1  )-----------( 259      ( 25 Jun 2017 05:48 )             24.2                         7.3    17.2  )-----------( 196      ( 24 Jun 2017 06:45 )             22.3       146    |  110    |  85     ----------------------------<  166       25 Jun 2017 05:48  4.3     |  31     |  2.70     145    |  109    |  92     ----------------------------<  222       24 Jun 2017 06:45  4.4     |  32     |  3.10     142    |  107    |  100    ----------------------------<  338       23 Jun 2017 13:57  5.4     |  29     |  3.50     Ca    8.9        25 Jun 2017 05:48  Ca    9.0        24 Jun 2017 06:45    Phos  2.8       25 Jun 2017 05:48  Phos  3.3       24 Jun 2017 06:45    Mg     2.9       25 Jun 2017 05:48  Mg     2.9       24 Jun 2017 06:45    TPro  7.3    /  Alb  2.2    /  TBili  0.2    /        25 Jun 2017 05:48  DBili  x      /  AST  31     /  ALT  36     /  AlkPhos  160      TPro  7.0    /  Alb  2.1    /  TBili  0.3    /        24 Jun 2017 06:45  DBili  x      /  AST  23     /  ALT  29     /  AlkPhos  146          Serum Osmo:   Serum Uric Acid:   MATTHEW:   Double Stranded DNA:   C3:   C3 Nephritic Factor:   C4:   p-ANCA:   c-ANCA:   Anti-GBM:   WANDA-Smith Antibody:   Immunofixation:   [place]/Lambda Free Light Chain:   SPEP:   Hepatitis Panel:   HIV-1/2:     Urine Studies:      Urine chemistry:   Urine Na: Sodium, Random Urine: <20 mmol/L (06-23 @ 20:29)    Urine Creatinine: Creatinine, Random Urine: 84 mg/dL (06-23 @ 20:29)    Urine Protein/Cr ratio:  Urine K:   Urine Osm:   24 Hr urine studies:     24 hr urine Creatinine Clearance:    RADIOLOGY & ADDITIONAL STUDIES:

## 2017-06-25 NOTE — PROGRESS NOTE ADULT - ATTENDING COMMENTS
58 y/o male PMHx DM2 (insulin), neuropathy, CKD, HTN, JYOTI (CPAP), foot ulcer, metastatic SCC of right tonsil with airway obstruction admitted with acute respiratory failure, airway obstruction, LLL pneumonia, severe sepsis, encephalopathy (metabolic vs CNS mets), CARMEL on CKD, hyperkalemia, uncontrolled DM2. Worsening anemia today.     -avoid benzos, opioids, other CNS meds  -will do MRI brain on 6/26 if stable off bipap  -decrease dexamethasone to 4 mg q12  -mental status improved, tolerating off bipap, no resp distress, will use bipap prn + qhs  -seen by ENT, recommends trach which the patient has refused in the past  -BP stable, continue metoprolol  -NPO, aspiration precautions, start TF  -decrease ivf, renal fn improving, keep spivey, K normal  -continue vanc, zosyn (increase dose), f/u cx  -continue aspirin  -no active bleeding, monitor H/H  -lantus (may need to increase), ISS  -sc heparin for DVT ppx  -prognosis poor  -cc 36 mins 56 y/o male PMHx DM2 (insulin), neuropathy, CKD, HTN, JYOTI (CPAP), foot ulcer, metastatic SCC of right tonsil with airway obstruction admitted with acute respiratory failure, airway obstruction, LLL pneumonia, severe sepsis, encephalopathy (metabolic vs CNS mets), CARMEL on CKD, hyperkalemia, uncontrolled DM2. Delirium today.     -improving  -daily reorientation  -avoid benzos, opioids, other CNS meds  -MRI brain on 6/26 if stable   -no signs of airway obstruction, decrease dex  -bipap qhs + prn  -ENT f/u  -BP stable, continue metoprolol  -aspiration precautions, continue TF  -d/c spivey, ivf  -cx neg, BP stable, d/c vanc, continue zosyn (increase dose)  -continue aspirin  -no active bleeding, anemia stable  -glucose controlled, decrease lantus, ISS  -sc heparin for DVT ppx  -prognosis guarded  -PT, OOB  -stable for floor

## 2017-06-26 PROCEDURE — 70551 MRI BRAIN STEM W/O DYE: CPT | Mod: 26

## 2017-06-26 PROCEDURE — 73630 X-RAY EXAM OF FOOT: CPT | Mod: 26,50

## 2017-06-26 RX ADMIN — Medication 2 MILLIGRAM(S): at 06:03

## 2017-06-26 RX ADMIN — INSULIN GLARGINE 15 UNIT(S): 100 INJECTION, SOLUTION SUBCUTANEOUS at 21:55

## 2017-06-26 RX ADMIN — Medication 2: at 06:03

## 2017-06-26 RX ADMIN — PIPERACILLIN AND TAZOBACTAM 25 GRAM(S): 4; .5 INJECTION, POWDER, LYOPHILIZED, FOR SOLUTION INTRAVENOUS at 21:55

## 2017-06-26 RX ADMIN — Medication 100 MILLIGRAM(S): at 18:44

## 2017-06-26 RX ADMIN — Medication 3: at 00:45

## 2017-06-26 RX ADMIN — Medication 2 MILLIGRAM(S): at 18:44

## 2017-06-26 RX ADMIN — Medication 2: at 18:45

## 2017-06-26 RX ADMIN — HEPARIN SODIUM 5000 UNIT(S): 5000 INJECTION INTRAVENOUS; SUBCUTANEOUS at 14:32

## 2017-06-26 RX ADMIN — HEPARIN SODIUM 5000 UNIT(S): 5000 INJECTION INTRAVENOUS; SUBCUTANEOUS at 21:55

## 2017-06-26 RX ADMIN — HEPARIN SODIUM 5000 UNIT(S): 5000 INJECTION INTRAVENOUS; SUBCUTANEOUS at 06:04

## 2017-06-26 RX ADMIN — PIPERACILLIN AND TAZOBACTAM 25 GRAM(S): 4; .5 INJECTION, POWDER, LYOPHILIZED, FOR SOLUTION INTRAVENOUS at 14:31

## 2017-06-26 RX ADMIN — Medication 100 MILLIGRAM(S): at 06:04

## 2017-06-26 RX ADMIN — FINASTERIDE 5 MILLIGRAM(S): 5 TABLET, FILM COATED ORAL at 12:13

## 2017-06-26 RX ADMIN — PANTOPRAZOLE SODIUM 40 MILLIGRAM(S): 20 TABLET, DELAYED RELEASE ORAL at 06:05

## 2017-06-26 RX ADMIN — PIPERACILLIN AND TAZOBACTAM 25 GRAM(S): 4; .5 INJECTION, POWDER, LYOPHILIZED, FOR SOLUTION INTRAVENOUS at 06:04

## 2017-06-26 RX ADMIN — Medication 3: at 12:13

## 2017-06-26 RX ADMIN — Medication 81 MILLIGRAM(S): at 12:13

## 2017-06-26 NOTE — CONSULT NOTE ADULT - CONSULT REQUESTED DATE/TIME
23-Jun-2017
23-Jun-2017 07:34
23-Jun-2017 14:10
23-Jun-2017 16:45
23-Jun-2017 18:30
26-Jun-2017 15:54

## 2017-06-26 NOTE — PROGRESS NOTE ADULT - PROBLEM SELECTOR PLAN 3
seen by ENT  recommended trach which patient refuses  continue dexamethasone seen by ENT  recommended trach which patient refuses   continue dexamethasone taper Palliative care consult requested ,to discuss advance directives and complete MOLST -DNR.DNI comfort care would be appropraite level of care at this point

## 2017-06-26 NOTE — PROGRESS NOTE ADULT - SUBJECTIVE AND OBJECTIVE BOX
Patient seen and examined today Plan discussed/Questions answered  (with patient/ancillary staff/colleagues) Chart notes reviewd More detailed Pulm/Critical Care notes, assessment and plan  will be added later today as needed after reviewing further labs as they become available     Notable interval events reviewed    ROS/PE  . REVIEW OF SYMPTOMS    Able to give ROS  Yes     RELIABLE  NO   Weakness Yes   Chills No   Vision changes No  Lymph nodes No enlarged glands   Endocrine No unexplained hair loss No het or cold intolerance   Allergy No hives   Sore throat No   Coughing blood No  Headache No  Confusion YES  Chest pain No  Palpitations No   Pain abdomen NO   Shortness of breath YES  Vomiting NO  Pain neck No  Pain abdomen NO     PHYSICAL EXAM    HEENT Unremarkable PERRLA atraumatic  RESP Fair air entry EXP prolonged    Harsh breath sound Resp distres mild  CARDIAC S1 S2 No S3     NO JVD   Awake Alert and ORIENTED x tw  ABDOMEN SOFT BS PRESENT NOT DISTENDED No hepatosplenomegaly  PEDAL EDEMA present No calf tenderness  NO rash GENERAL Not TOXIC looking  . Patient seen and examined today Plan discussed/Questions answered  (with patient/ancillary staff/colleagues) Chart notes reviewd More detailed Pulm/Critical Care notes, assessment and plan  will be added later today as needed after reviewing further labs as they become available     Notable interval events reviewed    ROS/PE  . REVIEW OF SYMPTOMS    Able to give ROS  Yes     RELIABLE  NO   Weakness Yes   Chills No   Vision changes No  Lymph nodes No enlarged glands   Endocrine No unexplained hair loss No het or cold intolerance   Allergy No hives   Sore throat No   Coughing blood No  Headache No  Confusion YES  Chest pain No  Palpitations No   Pain abdomen NO   Shortness of breath YES  Vomiting NO  Pain neck No  Pain abdomen NO     PHYSICAL EXAM    HEENT Unremarkable PERRLA atraumatic  RESP Fair air entry EXP prolonged    Harsh breath sound Resp distres mild  CARDIAC S1 S2 No S3     NO JVD   Awake Alert and ORIENTED x tw  ABDOMEN SOFT BS PRESENT NOT DISTENDED No hepatosplenomegaly  PEDAL EDEMA present No calf tenderness  NO rash GENERAL Not TOXIC looking  . 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  JYOTI (on CPAP  14), PVD smoker who presented from [facility] with resp distress and fever T 101 In ER noted to have insp stridor ABG on 100% (9a) 734/54/92 and was admitted to [facility] ICU 6/23/2017 6/25 I dw pt's spouse and she told me pt absolutely declines trach even if it is lifesaving situation Pt is dnr and prognosis is poor He was moved to reg floor 6/25 and plan is for MR brain to look for mets He got noct BPAP 16/8/.4 for JYOTI and for resp failure and was Rxed with zosyn (6/24) for asp pneum and decadron taper for uao and was given Glucerna PEG feeds   VITALS/LABS  6/26/2017 afeb 73 120/70 16 99%   6/25/2017 W 16.1 Hb 7.9 Plt 259 Na 146 K 4.3 CO2 31 Cr 2.7   PROBLEM ASSESSMENT PLAN   UAO  6/23 Seen by ENT HO SCC R tonsil sp RT (not told well) sp Chemo last 10/2016) Need trach which he has declined   On  Decadron 2.2 (6/25)   RESP   6/24 6a BPAP 16/8/14/.4 736/52/142   Monitor PO ABG Target PO 90-96%   ASPIRATION PNEUMONIA   6/23-6/24-6/25/2017 W 24.5-17.2-16.1  6/24 pct 1.56 6/23 bc n 6/23 uc n   6/23 LA 1.6-1.1  6/22/2017 CXR lll infiltr  zosyn (6/23) Vanco   VANCO LEVEL MONITORING IN RENAL PATIENT   6/24 Vanco 7.8   COPD   albuterol hfa.4p (6/23)   CAD  ASA 81 (6/23) metoprolol 100.2 (6/23)   CARMEL   8/9/2016-6/23-6/24-6/25/2017 Cr 3.5-3.9-3.1-2.7     6/23 Susan Below 20   1/2 NS 75 (6/24)   ANEMIA LIKELY SEC CKD   8/9/2016-6/23-6/24-6/25/2017  Hb 8.1-8.5-7.3-7.9   OBTUNDATION   mr being planned for 6/26 Improved   GLOBAL ISSUE/BEST PRACTICE:        PROBLEM:      Analgesia:     na                        PROBLEM: Sedation:     na               PROBLEM: HOB elevation:   yes             PROBLEM: Stress ulcer proph:    protonix 40 (6/24)                       PROBLEM: VTE prophylaxis:      hsc (6/23)                        PROBLEM: Glycemic control:    iss (6/23)             PROBLEM: Nutrition:    Glucerna 1.2 1680 (6/24)                       PROBLEM: Advanced directive: dnr     PROBLEM: Allergies:  na  TIME SPENT Over 25 minutes aggregate time spent on encounter; activities included   direct patient care, counseling and/or coordinating care reviewing notes, lab data/ imaging , discussion with multidisciplinary team/ patient  /family. Risks, benefits, alternatives  discussed in detail. Questions/concerns  were addressed  to the best of my ability .

## 2017-06-26 NOTE — PROGRESS NOTE ADULT - PROBLEM SELECTOR PLAN 10
Palliative care consult requested ,to discuss advance directives and complete MOLST Palliative care consult requested ,to discuss advance directives and complete MOLST CMO would be appropriate level of care at this point

## 2017-06-26 NOTE — CONSULT NOTE ADULT - CONSULT REASON
.
CARDIAC EVALUATION
Stage 1 heel ulcer right
carcinoma of tonsil. upper airway obstruction
ckd and zacarias
possible upper airway obstruction
resp failure, airway obstruction
Evaluation for pneumonia

## 2017-06-26 NOTE — PROGRESS NOTE ADULT - SUBJECTIVE AND OBJECTIVE BOX
Interval History:    CENTRAL LINE:   [  ] YES       [  ] NO  ANDINO:                 [  ] YES       [  ] NO         REVIEW OF SYSTEMS:  All Systems below were reviewed and are negative [  ]  HEENT:  ID:  Pulmonary:  Cardiac:  GI:  Renal:  Musculoskeletal:  All other systems above were reviewed and are negative   [  ]      MEDICATIONS  (STANDING):  finasteride 5milliGRAM(s) Oral daily  aspirin enteric coated 81milliGRAM(s) Oral daily  metoprolol 100milliGRAM(s) Oral every 12 hours  pantoprazole   Suspension 40milliGRAM(s) Oral before breakfast  heparin  Injectable 5000Unit(s) SubCutaneous every 8 hours  piperacillin/tazobactam IVPB. 3.375Gram(s) IV Intermittent every 8 hours  dexamethasone    Solution 2milliGRAM(s) Oral every 12 hours  insulin glargine Injectable (LANTUS) 15Unit(s) SubCutaneous at bedtime  insulin lispro (HumaLOG) corrective regimen sliding scale  SubCutaneous every 6 hours  dextrose 5%. 1000milliLiter(s) IV Continuous <Continuous>  dextrose 50% Injectable 12.5Gram(s) IV Push once  dextrose 50% Injectable 25Gram(s) IV Push once  dextrose 50% Injectable 25Gram(s) IV Push once    MEDICATIONS  (PRN):  ALBUTerol    90 MICROgram(s) HFA Inhaler 2Puff(s) Inhalation every 6 hours PRN Shortness of Breath and/or Wheezing  dextrose Gel 1Dose(s) Oral once PRN Blood Glucose LESS THAN 70 milliGRAM(s)/deciliter  glucagon  Injectable 1milliGRAM(s) IntraMuscular once PRN Glucose LESS THAN 70 milligrams/deciliter      Vital Signs Last 24 Hrs  T(C): 36.9, Max: 36.9 (06-26 @ 22:02)  T(F): 98.4, Max: 98.4 (06-26 @ 22:02)  HR: 69 (67 - 73)  BP: 158/82 (128/77 - 158/82)  BP(mean): --  RR: 19 (16 - 19)  SpO2: 100% (97% - 100%)    I&O's Summary    I & Os for current day (as of 26 Jun 2017 22:16)  =============================================  IN: 800 ml / OUT: 1080 ml / NET: -280 ml      PHYSICAL EXAM:  HEENT: NC/AT; PERRLA  Neck: Soft; no tenderness  Lungs: CTA bilaterally; no wheezing.   Heart:  Abdomen:  Genital/ Rectal:  Extremities:  Neurologic:  Vascular:      LABORATORY:    CBC Full  -  ( 25 Jun 2017 05:48 )  WBC Count : 16.1 K/uL  Hemoglobin : 7.9 g/dL  Hematocrit : 24.2 %  Platelet Count - Automated : 259 K/uL  Mean Cell Volume : 107.4 fl  Mean Cell Hemoglobin : 34.8 pg  Mean Cell Hemoglobin Concentration : 32.5 gm/dL  Auto Neutrophil # : 13.4 K/uL  Auto Lymphocyte # : 1.8 K/uL  Auto Monocyte # : 0.9 K/uL  Auto Eosinophil # : 0.0 K/uL  Auto Basophil # : 0.0 K/uL  Auto Neutrophil % : 83.0 %  Auto Lymphocyte % : 11.0 %  Auto Monocyte % : 5.6 %  Auto Eosinophil % : 0.2 %  Auto Basophil % : 0.2 %      ESR:                   06-24 @ 06:45  --    C-Reactive Protein:     06-24 @ 06:45  --    Procalcitonin:           06-24 @ 06:45   1.56      06-25    146<H>  |  110<H>  |  85<H>  ----------------------------<  166<H>  4.3   |  31  |  2.70<H>    Ca    8.9      25 Jun 2017 05:48  Phos  2.8     06-25  Mg     2.9     06-25    TPro  7.3  /  Alb  2.2<L>  /  TBili  0.2  /  DBili  x   /  AST  31  /  ALT  36  /  AlkPhos  160<H>  06-25          Assessment and Plan:          Taz Hahn MD   [phone]. He is sleeping. off BIBAP. No fevers noted.   Out of ICU.     MEDICATIONS  (STANDING):  finasteride 5milliGRAM(s) Oral daily  aspirin enteric coated 81milliGRAM(s) Oral daily  metoprolol 100milliGRAM(s) Oral every 12 hours  pantoprazole   Suspension 40milliGRAM(s) Oral before breakfast  heparin  Injectable 5000Unit(s) SubCutaneous every 8 hours  piperacillin/tazobactam IVPB. 3.375Gram(s) IV Intermittent every 8 hours  dexamethasone    Solution 2milliGRAM(s) Oral every 12 hours  insulin glargine Injectable (LANTUS) 15Unit(s) SubCutaneous at bedtime  insulin lispro (HumaLOG) corrective regimen sliding scale  SubCutaneous every 6 hours  dextrose 5%. 1000milliLiter(s) IV Continuous <Continuous>  dextrose 50% Injectable 12.5Gram(s) IV Push once  dextrose 50% Injectable 25Gram(s) IV Push once  dextrose 50% Injectable 25Gram(s) IV Push once    MEDICATIONS  (PRN):  ALBUTerol    90 MICROgram(s) HFA Inhaler 2Puff(s) Inhalation every 6 hours PRN Shortness of Breath and/or Wheezing  dextrose Gel 1Dose(s) Oral once PRN Blood Glucose LESS THAN 70 milliGRAM(s)/deciliter  glucagon  Injectable 1milliGRAM(s) IntraMuscular once PRN Glucose LESS THAN 70 milligrams/deciliter      Vital Signs Last 24 Hrs  T(C): 36.9, Max: 36.9 (06-26 @ 22:02)  T(F): 98.4, Max: 98.4 (06-26 @ 22:02)  HR: 69 (67 - 73)  BP: 158/82 (128/77 - 158/82)  BP(mean): --  RR: 19 (16 - 19)  SpO2: 100% (97% - 100%)    I&O's Summary    I & Os for current day (as of 26 Jun 2017 22:16)  =============================================  IN: 800 ml / OUT: 1080 ml / NET: -280 ml      PHYSICAL EXAM:  HEENT: NC/AT; PERRLA  Neck: Soft; no tenderness  Lungs: CTA bilaterally; no wheezing.   Heart: RRR; no murmurs.  Abdomen: Soft. no masses  Extremities: No ulcers.    LABORATORY:    CBC Full  -  ( 25 Jun 2017 05:48 )  WBC Count : 16.1 K/uL  Hemoglobin : 7.9 g/dL  Hematocrit : 24.2 %  Platelet Count - Automated : 259 K/uL  Mean Cell Volume : 107.4 fl  Mean Cell Hemoglobin : 34.8 pg  Mean Cell Hemoglobin Concentration : 32.5 gm/dL  Auto Neutrophil # : 13.4 K/uL  Auto Lymphocyte # : 1.8 K/uL  Auto Monocyte # : 0.9 K/uL  Auto Eosinophil # : 0.0 K/uL  Auto Basophil # : 0.0 K/uL  Auto Neutrophil % : 83.0 %  Auto Lymphocyte % : 11.0 %  Auto Monocyte % : 5.6 %  Auto Eosinophil % : 0.2 %  Auto Basophil % : 0.2 %      ESR:                   06-24 @ 06:45  --    C-Reactive Protein:     06-24 @ 06:45  --    Procalcitonin:           06-24 @ 06:45   1.56      06-25    146<H>  |  110<H>  |  85<H>  ----------------------------<  166<H>  4.3   |  31  |  2.70<H>    Ca    8.9      25 Jun 2017 05:48  Phos  2.8     06-25  Mg     2.9     06-25    TPro  7.3  /  Alb  2.2<L>  /  TBili  0.2  /  DBili  x   /  AST  31  /  ALT  36  /  AlkPhos  160<H>  06-25    Assessment and Plan:      1. Respiratory failure.  2. Suspected aspiration pneumonia.  3. SCC of R tonsil with metastasis to R neck.    . Continue IV Zosyn for suspected aspiration pneumonia.  . Anticipate oral abx in 48 hrs.  . Discharge planning.     Taz Hahn MD   [phone].

## 2017-06-26 NOTE — PROGRESS NOTE ADULT - SUBJECTIVE AND OBJECTIVE BOX
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  Osteomyelitis of ankle or foot  Diabetic retinopathy associated with type 2 diabetes mellitus: Insulin  Lumbago  Neuropathy in diabetes  Tobacco dependence  Cellulitis  JYOTI on CPAP  Hypertension  Obesity  DVT (deep venous thrombosis)  Diabetes mellitus  No significant past surgical history    HEALTH ISSUES - PROBLEM Dx:  Metabolic encephalopathy: Metabolic encephalopathy  Tonsillar neoplasm: Tonsillar neoplasm  DVT (deep venous thrombosis): DVT (deep venous thrombosis)  Sepsis: Sepsis  Acute respiratory failure: Acute respiratory failure  Head and neck swelling: Head and neck swelling  Type 2 diabetes mellitus with other neurologic complication, without long-term current use of insulin: Type 2 diabetes mellitus with other neurologic complication, without long-term current use of insulin  Encephalopathy, unspecified: Encephalopathy, unspecified  CARMEL (acute kidney injury): CARMEL (acute kidney injury)  Palliative care by specialist: Palliative care by specialist  Prophylactic measure: Prophylactic measure  Neuropathy in diabetes: Neuropathy in diabetes  Obesity: Obesity  Osteomyelitis of ankle or foot: Osteomyelitis of ankle or foot  Respiratory failure: Respiratory failure  Renal failure: Renal failure  JYOTI on CPAP: JYOTI on CPAP  Heel ulcer due to DM: Heel ulcer due to DM  Hypertension: Hypertension  Diabetes mellitus: Diabetes mellitus  Squamous cell carcinoma of tonsil: Squamous cell carcinoma of tonsil  Pneumonia due to infectious organism, unspecified laterality, unspecified part of lung: Pneumonia due to infectious organism, unspecified laterality, unspecified part of lung          MEDICATIONS  (STANDING):  finasteride 5milliGRAM(s) Oral daily  aspirin enteric coated 81milliGRAM(s) Oral daily  metoprolol 100milliGRAM(s) Oral every 12 hours  pantoprazole   Suspension 40milliGRAM(s) Oral before breakfast  heparin  Injectable 5000Unit(s) SubCutaneous every 8 hours  piperacillin/tazobactam IVPB. 3.375Gram(s) IV Intermittent every 8 hours  dexamethasone    Solution 2milliGRAM(s) Oral every 12 hours  insulin glargine Injectable (LANTUS) 15Unit(s) SubCutaneous at bedtime  insulin lispro (HumaLOG) corrective regimen sliding scale  SubCutaneous every 6 hours  dextrose 5%. 1000milliLiter(s) IV Continuous <Continuous>  dextrose 50% Injectable 12.5Gram(s) IV Push once  dextrose 50% Injectable 25Gram(s) IV Push once  dextrose 50% Injectable 25Gram(s) IV Push once    MEDICATIONS  (PRN):  ALBUTerol    90 MICROgram(s) HFA Inhaler 2Puff(s) Inhalation every 6 hours PRN Shortness of Breath and/or Wheezing  dextrose Gel 1Dose(s) Oral once PRN Blood Glucose LESS THAN 70 milliGRAM(s)/deciliter  glucagon  Injectable 1milliGRAM(s) IntraMuscular once PRN Glucose LESS THAN 70 milligrams/deciliter      OBJECTIVE    T(C): 36.8, Max: 36.8 (06-25 @ 15:47)  HR: 68 (66 - 73)  BP: 128/77 (116/64 - 133/65)  RR: 16 (9 - 27)  SpO2: 97% (97% - 100%)  Wt(kg): --  I&O's Summary    I & Os for current day (as of 26 Jun 2017 12:50)  =============================================  IN: 800 ml / OUT: 1080 ml / NET: -280 ml        REVIEW OF SYSTEMS:  CONSTITUTIONAL: No fever, weight loss, or fatigue  EYES: No eye pain, visual disturbances, or discharge  ENMT:  No difficulty hearing, tinnitus, vertigo; No sinus or throat pain  NECK: No pain or stiffness  BREASTS: No pain, masses, or nipple discharge  RESPIRATORY: No cough, wheezing, chills or hemoptysis; No shortness of breath  CARDIOVASCULAR: No chest pain, palpitations, dizziness, or leg swelling  GASTROINTESTINAL: No abdominal pain. No nausea, vomiting; No diarrhea or constipation. No melena or hematochezia.  GENITOURINARY: No dysuria, frequency, hematuria, or incontinence  NEUROLOGICAL: No headaches, memory loss, loss of strength, numbness, or tremors  SKIN: No itching, burning, rashes, or lesions   LYMPH NODES: No enlarged glands  MUSCULOSKELETAL: No joint pain or swelling; No muscle, back, or extremity pain  HEME/LYMPH: No easy bruising, or bleeding gums  ALLERY AND IMMUNOLOGIC: No hives or eczema      PHYSICAL EXAM:  Appearance: NAD.   HEENT:   Moist oral mucosa. Conjunctiva clear b/l.   Neck : supple  Cardiovascular: RRR ,S1S2 present  Respiratory: Lungs CTAB.  Gastrointestinal:  Soft, nontender. No rebound. No rigidity. BS present	  Skin: No rashes, No ecchymoses, No cyanosis.	  Neurologic: Non-focal. Moving all extremities. Following commands.  Extremities: No edema. No erythema. No calf tenderness.  	  LABS:                        7.9    16.1  )-----------( 259      ( 25 Jun 2017 05:48 )             24.2     06-25    146<H>  |  110<H>  |  85<H>  ----------------------------<  166<H>  4.3   |  31  |  2.70<H>    Ca    8.9      25 Jun 2017 05:48  Phos  2.8     06-25  Mg     2.9     06-25    TPro  7.3  /  Alb  2.2<L>  /  TBili  0.2  /  DBili  x   /  AST  31  /  ALT  36  /  AlkPhos  160<H>  06-25          RADIOLOGY & ADDITIONAL TESTS:    ASSESSMENT/PLAN: PROGRESS NOTE    OVERNIGHT  No new issues reported by medical staff . 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  Osteomyelitis of ankle or foot  Diabetic retinopathy associated with type 2 diabetes mellitus: Insulin  Lumbago  Neuropathy in diabetes  Tobacco dependence  Cellulitis  JYOTI on CPAP  Hypertension  Obesity  DVT (deep venous thrombosis)  Diabetes mellitus  No significant past surgical history    HEALTH ISSUES - PROBLEM Dx:  Metabolic encephalopathy: Metabolic encephalopathy  Tonsillar neoplasm: Tonsillar neoplasm  DVT (deep venous thrombosis): DVT (deep venous thrombosis)  Sepsis: Sepsis  Acute respiratory failure: Acute respiratory failure  Head and neck swelling: Head and neck swelling  Type 2 diabetes mellitus with other neurologic complication, without long-term current use of insulin: Type 2 diabetes mellitus with other neurologic complication, without long-term current use of insulin  Encephalopathy, unspecified: Encephalopathy, unspecified  CARMEL (acute kidney injury): CARMEL (acute kidney injury)  Palliative care by specialist: Palliative care by specialist  Prophylactic measure: Prophylactic measure  Neuropathy in diabetes: Neuropathy in diabetes  Obesity: Obesity  Osteomyelitis of ankle or foot: Osteomyelitis of ankle or foot  Respiratory failure: Respiratory failure  Renal failure: Renal failure  JYOTI on CPAP: JYOTI on CPAP  Heel ulcer due to DM: Heel ulcer due to DM  Hypertension: Hypertension  Diabetes mellitus: Diabetes mellitus  Squamous cell carcinoma of tonsil: Squamous cell carcinoma of tonsil  Pneumonia due to infectious organism, unspecified laterality, unspecified part of lung: Pneumonia due to infectious organism, unspecified laterality, unspecified part of lung          MEDICATIONS  (STANDING):  finasteride 5milliGRAM(s) Oral daily  aspirin enteric coated 81milliGRAM(s) Oral daily  metoprolol 100milliGRAM(s) Oral every 12 hours  pantoprazole   Suspension 40milliGRAM(s) Oral before breakfast  heparin  Injectable 5000Unit(s) SubCutaneous every 8 hours  piperacillin/tazobactam IVPB. 3.375Gram(s) IV Intermittent every 8 hours  dexamethasone    Solution 2milliGRAM(s) Oral every 12 hours  insulin glargine Injectable (LANTUS) 15Unit(s) SubCutaneous at bedtime  insulin lispro (HumaLOG) corrective regimen sliding scale  SubCutaneous every 6 hours  dextrose 5%. 1000milliLiter(s) IV Continuous <Continuous>  dextrose 50% Injectable 12.5Gram(s) IV Push once  dextrose 50% Injectable 25Gram(s) IV Push once  dextrose 50% Injectable 25Gram(s) IV Push once    MEDICATIONS  (PRN):  ALBUTerol    90 MICROgram(s) HFA Inhaler 2Puff(s) Inhalation every 6 hours PRN Shortness of Breath and/or Wheezing  dextrose Gel 1Dose(s) Oral once PRN Blood Glucose LESS THAN 70 milliGRAM(s)/deciliter  glucagon  Injectable 1milliGRAM(s) IntraMuscular once PRN Glucose LESS THAN 70 milligrams/deciliter      OBJECTIVE    T(C): 36.8, Max: 36.8 (06-25 @ 15:47)  HR: 68 (66 - 73)  BP: 128/77 (116/64 - 133/65)  RR: 16 (9 - 27)  SpO2: 97% (97% - 100%)  Wt(kg): --  I&O's Summary    I & Os for current day (as of 26 Jun 2017 12:50)  =============================================  IN: 800 ml / OUT: 1080 ml / NET: -280 ml        REVIEW OF SYSTEMS:  ROS is unobtainable dur to lethargy and disoriented state  PHYSICAL EXAM:  Appearance: NAD. vss   HEENT:   Moist oral mucosa. Conjunctiva clear b/l.   Neck : supple   Cardiovascular: RRR ,S1S2 present  Respiratory: Lungs CTAB,no stridor   Gastrointestinal:  Soft, nontender. No rebound. PEG in place   Skin: No rashes, No ecchymoses, No cyanosis.	  Neurologic: Non-focal. Moving all extremities. Following commands.  Extremities: No edema. No erythema. No calf tenderness.  	  LABS:                        7.9    16.1  )-----------( 259      ( 25 Jun 2017 05:48 )             24.2     06-25    146<H>  |  110<H>  |  85<H>  ----------------------------<  166<H>  4.3   |  31  |  2.70<H>    Ca    8.9      25 Jun 2017 05:48  Phos  2.8     06-25  Mg     2.9     06-25    TPro  7.3  /  Alb  2.2<L>  /  TBili  0.2  /  DBili  x   /  AST  31  /  ALT  36  /  AlkPhos  160<H>  06-25          RADIOLOGY & ADDITIONAL TESTS: Labs and imaging reviewed electronically     ASSESSMENT/PLAN:

## 2017-06-26 NOTE — PROGRESS NOTE ADULT - ASSESSMENT
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 He did not undergo any surgery as he was a poor candidate. Thereafter, he was sent to a rehab facility where his status worsened overtime,he became LTC resident and was refusing tracheostomy which was offered few times  For the last few months, his mental and functional status has deteriorated He lost 150 lbs( his weight was 400 lbs when he was diagnosed with ca)  He has not been very responsive for about 10 days . He had a CT neck in May 2017 which showed severe airway obstruction. Patient was told he  needs a tracheostomy since he was diagnosed with SCC last August  He was started on augmentin recently by Dr Kay due to prorbale pneumonia and transferred to ER this morning due to fever and dyspnea Patient is not able to provide any hx in ER due to unresposive state and respiratory distress Admitted to ICU Palliative care consult requested ,to discuss advance directives and complete MOLST ,HCP requests everyhtning to be done ,but aware of patient's wishes not to have tracheostomy and in agreement with his decision Patient also found to be in ARF and to be hyperglycemic 56 yo AAM ,[facility] SNF resident with hx of DM,OBESITY,JYOTI,HTN,OM,DVT,R heel ulcer,PVD,R tonsillar ca ,brought to ER with fever 101.8 and sob ,he  was diagnosed with acute respiratory failure and found to have stridor and upper airway obstuction ,admitted to ICU ,placed on BIPAP and ENT eval requested 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 He did not undergo any surgery as he was a poor candidate. Thereafter, he was sent to a rehab facility where his status worsened overtime,he became LTC resident and was refusing tracheostomy which was offered few times  For the last few months, his mental and functional status has deteriorated He lost 150 lbs( his weight was 400 lbs when he was diagnosed with ca)  He has not been very responsive for about 10 days . He had a CT neck in May 2017 which showed severe airway obstruction. Patient was told he  needs a tracheostomy since he was diagnosed with SCC last August  He was started on augmentin recently by Dr Kay due to prorbale pneumonia and transferred to ER this morning due to fever and dyspnea Patient is not able to provide any hx in ER due to unresposive state and respiratory distress Admitted to ICU Palliative care consult requested ,to discuss advance directives and complete MOLST ,HCP requests everyhtning to be done ,but aware of patient's wishes not to have tracheostomy and in agreement with his decision Patient also found to be in ARF and to be hyperglycemic TRansferred to [facility] after stabilisation of condition Neurology eval called due to lethargy and confusion Capacity eval requested ,will discuss CMO initiation with the wife if patient found not to have a capacity

## 2017-06-26 NOTE — PROGRESS NOTE ADULT - SUBJECTIVE AND OBJECTIVE BOX
Patient is a 57y Male with past medical history of   htn        presenting with  anemia    who reports no complaints overnight.    REVIEW OF SYSTEMS:    CONSTITUTIONAL: No weakness, fevers or chills    Allergies    No Known Allergies    Intolerances        MEDICATIONS  (STANDING):  finasteride 5milliGRAM(s) Oral daily  aspirin enteric coated 81milliGRAM(s) Oral daily  metoprolol 100milliGRAM(s) Oral every 12 hours  pantoprazole   Suspension 40milliGRAM(s) Oral before breakfast  heparin  Injectable 5000Unit(s) SubCutaneous every 8 hours  piperacillin/tazobactam IVPB. 3.375Gram(s) IV Intermittent every 8 hours  dexamethasone    Solution 2milliGRAM(s) Oral every 12 hours  insulin glargine Injectable (LANTUS) 15Unit(s) SubCutaneous at bedtime  insulin lispro (HumaLOG) corrective regimen sliding scale  SubCutaneous every 6 hours  dextrose 5%. 1000milliLiter(s) IV Continuous <Continuous>  dextrose 50% Injectable 12.5Gram(s) IV Push once  dextrose 50% Injectable 25Gram(s) IV Push once  dextrose 50% Injectable 25Gram(s) IV Push once      Vitals:  T(F): 98.3, Max: 98.3 (06-26 @ 05:18)  HR: 68  BP: 128/77  BP(mean): 94  RR: 16  SpO2: 97%  Wt(kg): --    I and O's:    I & Os for current day (as of 06-26 @ 13:44)  =============================================  IN: 800 ml / OUT: 1080 ml / NET: -280 ml          PHYSICAL EXAM:    Constitutional: NAD,   Neck: No JVD  Respiratory: decrease bs b/l   Cardiovascular: S1 and S2  Gastrointestinal: BS+, soft, NT/ND  Extremities: No peripheral edema  Neurological:  no focal deficits  Psychiatric: Normal mood, normal affect    LABS:                        7.9    16.1  )-----------( 259      ( 25 Jun 2017 05:48 )             24.2       146    |  110    |  85     ----------------------------<  166       25 Jun 2017 05:48  4.3     |  31     |  2.70     145    |  109    |  92     ----------------------------<  222       24 Jun 2017 06:45  4.4     |  32     |  3.10     142    |  107    |  100    ----------------------------<  338       23 Jun 2017 13:57  5.4     |  29     |  3.50     Ca    8.9        25 Jun 2017 05:48  Ca    9.0        24 Jun 2017 06:45    Phos  2.8       25 Jun 2017 05:48  Phos  3.3       24 Jun 2017 06:45    Mg     2.9       25 Jun 2017 05:48  Mg     2.9       24 Jun 2017 06:45    TPro  7.3    /  Alb  2.2    /  TBili  0.2    /        25 Jun 2017 05:48  DBili  x      /  AST  31     /  ALT  36     /  AlkPhos  160      TPro  7.0    /  Alb  2.1    /  TBili  0.3    /        24 Jun 2017 06:45  DBili  x      /  AST  23     /  ALT  29     /  AlkPhos  146          Serum Osmo:   Serum Uric Acid:   MATTHEW:   Double Stranded DNA:   C3:   C3 Nephritic Factor:   C4:   p-ANCA:   c-ANCA:   Anti-GBM:   WANDA-Smith Antibody:   Immunofixation:   [place]/Lambda Free Light Chain:   SPEP:   Hepatitis Panel:   HIV-1/2:     Urine Studies:      Urine chemistry:   Urine Na: Sodium, Random Urine: <20 mmol/L (06-23 @ 20:29)    Urine Creatinine: Creatinine, Random Urine: 84 mg/dL (06-23 @ 20:29)    Urine Protein/Cr ratio:  Urine K:   Urine Osm:   24 Hr urine studies:     24 hr urine Creatinine Clearance:    RADIOLOGY & ADDITIONAL STUDIES:

## 2017-06-26 NOTE — PROGRESS NOTE ADULT - PROBLEM SELECTOR PLAN 2
Admitted for septic workup and evaluation,send blood and urine cx,serial lactate levels,monitor vitals closley,ivfs hydration,monitor urine output and renal profile,iv abx initiated Admitted for septic workup and evaluation,send blood and urine cx,serial lactate levels,monitor vitals closley,ivfs hydration,monitor urine output and renal profile,iv abx initiated Diagnosed with pneumonia secondary to aspiration Aspiration precautions administarted and being treated with zosyn ad per ID recommendation

## 2017-06-26 NOTE — PHYSICAL THERAPY INITIAL EVALUATION ADULT - ACTIVE RANGE OF MOTION EXAMINATION, REHAB EVAL
bilateral LE ROM limited, weakness./bilateral upper extremity Active ROM was WFL (within functional limits)

## 2017-06-26 NOTE — PHYSICAL THERAPY INITIAL EVALUATION ADULT - PERTINENT HX OF CURRENT PROBLEM, REHAB EVAL
pt is a 56 y/o male, admitted from [facility] nursing home, was there for long term care. ,brought to ER with fever 101.8 and sob ,he was diagnosed with acute respiratory failure and found to have stridor and upper airway obstruction. X-ray bilateral feet negative for osteomyelitis

## 2017-06-26 NOTE — PROGRESS NOTE ADULT - SUBJECTIVE AND OBJECTIVE BOX
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)  Respiratory failure (J96.90)  Renal failure (N19)  JYOTI on CPAP (G47.33)  Heel ulcer due to DM (E11.621)  Hypertension (I10)  Diabetes mellitus (E11.9)  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 He did not undergo any surgery as he was a poor candidate. Thereafter, he was sent to a rehab facility where his status worsened overtime,he became LTC resident and was refusing tracheostomy which was offered few times  For the last few months, his mental and functional status has deteriorated He lost 150 lbs( his weight was 400 lbs when he was diagnosed with ca)  He has not been very responsive for about 10 days . He had a CT neck in May 2017 which showed severe airway obstruction. Patient was told he  needs a tracheostomy since he was diagnosed with SCC last August  He was started on augmentin recently by Dr Kay due to prorbale pneumonia and transferred to ER this morning due to fever and dyspnea Patient is not able to provide any hx in ER due to unresposive state and respiratory distress Admitted to ICU Palliative care consult requested ,to discuss advance directives and complete MOLST ,HCP requests everyhtning to be done ,but aware of patient's wishes not to have tracheostomy and in agreement with his decision Patient also found to be in ARF and to be hyperglycemic (23 Jun 2017 07:52)      REVIEW OF SYSTEMS:    CONSTITUTIONAL: No fever, weight loss, or fatigue  EYES: No eye pain, visual disturbances, or discharge  ENMT:  No difficulty hearing, tinnitus, vertigo; No sinus or throat pain  NECK: No pain or stiffness  BREASTS: No pain, masses, or nipple discharge  RESPIRATORY: No cough, wheezing, chills or hemoptysis; No shortness of breath  CARDIOVASCULAR: No chest pain, palpitations, dizziness, or leg swelling  GASTROINTESTINAL: No abdominal or epigastric pain. No nausea, vomiting, or hematemesis; No diarrhea or constipation. No melena or hematochezia.  GENITOURINARY: No dysuria, frequency, hematuria, or incontinence  NEUROLOGICAL: No headaches, memory loss, loss of strength, numbness, or tremors  SKIN: No itching, burning, rashes, or lesions   LYMPH NODES: No enlarged glands  ENDOCRINE: No heat or cold intolerance; No hair loss  MUSCULOSKELETAL: No joint pain or swelling; No muscle, back, or extremity pain  PSYCHIATRIC: No depression, anxiety, mood swings, or difficulty sleeping  HEME/LYMPH: No easy bruising, or bleeding gums  ALLERGY AND IMMUNOLOGIC: No hives or eczema    MEDICATIONS  (STANDING):  finasteride 5milliGRAM(s) Oral daily  aspirin enteric coated 81milliGRAM(s) Oral daily  metoprolol 100milliGRAM(s) Oral every 12 hours  pantoprazole   Suspension 40milliGRAM(s) Oral before breakfast  heparin  Injectable 5000Unit(s) SubCutaneous every 8 hours  piperacillin/tazobactam IVPB. 3.375Gram(s) IV Intermittent every 8 hours  dexamethasone    Solution 2milliGRAM(s) Oral every 12 hours  insulin glargine Injectable (LANTUS) 15Unit(s) SubCutaneous at bedtime  insulin lispro (HumaLOG) corrective regimen sliding scale  SubCutaneous every 6 hours  dextrose 5%. 1000milliLiter(s) IV Continuous <Continuous>  dextrose 50% Injectable 12.5Gram(s) IV Push once  dextrose 50% Injectable 25Gram(s) IV Push once  dextrose 50% Injectable 25Gram(s) IV Push once    MEDICATIONS  (PRN):  ALBUTerol    90 MICROgram(s) HFA Inhaler 2Puff(s) Inhalation every 6 hours PRN Shortness of Breath and/or Wheezing  dextrose Gel 1Dose(s) Oral once PRN Blood Glucose LESS THAN 70 milliGRAM(s)/deciliter  glucagon  Injectable 1milliGRAM(s) IntraMuscular once PRN Glucose LESS THAN 70 milligrams/deciliter      ALLERGIES: No Known Allergies      FAMILY HISTORY:  Family history of embolic stroke (Uncle)  Family history of acute myocardial infarction (Uncle)  Family history of hypertension (Grandparent, Uncle)  Family history of diabetes mellitus (DM)      PHYSICAL EXAMINATION:  -----------------------------  T(C): 36.8, Max: 36.8 (06-25 @ 15:47)  HR: 73 (65 - 73)  BP: 128/77 (116/64 - 138/78)  RR: 16 (9 - 27)  SpO2: 99% (97% - 100%)  Wt(kg): --    I & Os for current day (as of 06-26 @ 09:19)  =============================================  IN:    Glucerna: 570 ml    Oral Fluid: 130 ml    Solution: 100 ml    Total IN: 800 ml  ---------------------------------------------  OUT:    Indwelling Catheter - Urethral: 1080 ml    Total OUT: 1080 ml  ---------------------------------------------  Total NET: -280 ml        Constitutional: well developed, normal appearance, well groomed, well nourished, no deformities and no acute distress.   Eyes: the conjunctiva exhibited no abnormalities and the eyelids demonstrated no xanthelasmas.   HEENT: normal oral mucosa, no oral pallor and no oral cyanosis.   Neck: normal jugular venous A waves present, normal jugular venous V waves present and no jugular venous marrufo A waves.   Pulmonary: no respiratory distress, normal respiratory rhythm and effort, no accessory muscle use and lungs were clear to auscultation bilaterally.   Cardiovascular: heart rate and rhythm were normal, normal S1 and S2 and no murmur, gallop, rub, heave or thrill are present.   Abdomen: soft, non-tender, no hepato-splenomegaly and no abdominal mass palpated.   Musculoskeletal: the gait could not be assessed..   Extremities: no clubbing of the fingernails, no localized cyanosis, no petechial hemorrhages and no ischemic changes.   Skin: normal skin color and pigmentation, no rash, no venous stasis, no skin lesions, no skin ulcer and no xanthoma was observed.   Psychiatric: oriented to person, place, and time, the affect was normal, the mood was normal and not feeling anxious.     LABS:   --------  06-25    146<H>  |  110<H>  |  85<H>  ----------------------------<  166<H>  4.3   |  31  |  2.70<H>    Ca    8.9      25 Jun 2017 05:48  Phos  2.8     06-25  Mg     2.9     06-25    TPro  7.3  /  Alb  2.2<L>  /  TBili  0.2  /  DBili  x   /  AST  31  /  ALT  36  /  AlkPhos  160<H>  06-25                         7.9    16.1  )-----------( 259      ( 25 Jun 2017 05:48 )             24.2                 RADIOLOGY:  -----------------        ECG:

## 2017-06-26 NOTE — PROGRESS NOTE ADULT - SUBJECTIVE AND OBJECTIVE BOX
CHIEF COMPLAINT:    SUBJECTIVE: As per nurse no new changes over night. Podiatry follow for Right heel ulcer.       Vital Signs Last 24 Hrs  T(C): 36.8, Max: 36.8 (06-25 @ 15:47)  T(F): 98.3, Max: 98.3 (06-26 @ 05:18)  HR: 68 (66 - 73)  BP: 128/77 (116/64 - 133/65)  BP(mean): 94 (81 - 94)  RR: 16 (9 - 27)  SpO2: 97% (97% - 100%)    OBJECTIVE:   PHYSICAL EXAM:  Focused Lower Extremity Physical Examination:   General: NAD, A&Ox3, WDWN  Vascular: DP/PT palpable 2/4, B/L. Capillary refill<3 seconds to all digits, B/L. Digital hairs presents, B/L.   Neurologic: Gross epicritic sensation intact to all digits, B/L.   Dermatologic: All webspaces are clean, dry and intact, B/L. No edema, erythema, ecchymosis or open lesions, B/L.   Muskculoskeletal: 5/5 muscle strength in all 4 quadrants, B/L. Pain with palpation     RADIOLOGY:    LABS:                          7.9    16.1  )-----------( 259      ( 25 Jun 2017 05:48 )             24.2       06-25    146<H>  |  110<H>  |  85<H>  ----------------------------<  166<H>  4.3   |  31  |  2.70<H>    Ca    8.9      25 Jun 2017 05:48  Phos  2.8     06-25  Mg     2.9     06-25    TPro  7.3  /  Alb  2.2<L>  /  TBili  0.2  /  DBili  x   /  AST  31  /  ALT  36  /  AlkPhos  160<H>  06-25        Wound Culture:               06-23 @ 18:58  --  --  .Urine Catheterized    Wound Culture:               06-23 @ 08:49  --  --  .Urine Catheterized    Wound Culture:               06-23 @ 08:47  --  --  .Blood Blood-Peripheral        finasteride 5milliGRAM(s) Oral daily  aspirin enteric coated 81milliGRAM(s) Oral daily  metoprolol 100milliGRAM(s) Oral every 12 hours  ALBUTerol    90 MICROgram(s) HFA Inhaler 2Puff(s) Inhalation every 6 hours PRN  pantoprazole   Suspension 40milliGRAM(s) Oral before breakfast  heparin  Injectable 5000Unit(s) SubCutaneous every 8 hours  piperacillin/tazobactam IVPB. 3.375Gram(s) IV Intermittent every 8 hours  dexamethasone    Solution 2milliGRAM(s) Oral every 12 hours  insulin glargine Injectable (LANTUS) 15Unit(s) SubCutaneous at bedtime  insulin lispro (HumaLOG) corrective regimen sliding scale  SubCutaneous every 6 hours  dextrose 5%. 1000milliLiter(s) IV Continuous <Continuous>  dextrose Gel 1Dose(s) Oral once PRN  dextrose 50% Injectable 12.5Gram(s) IV Push once  dextrose 50% Injectable 25Gram(s) IV Push once  dextrose 50% Injectable 25Gram(s) IV Push once  glucagon  Injectable 1milliGRAM(s) IntraMuscular once PRN CHIEF COMPLAINT:    SUBJECTIVE: As per nurse no new changes over night. Podiatry follow for Right heel ulcer.       Vital Signs Last 24 Hrs  T(C): 36.8, Max: 36.8 (06-25 @ 15:47)  T(F): 98.3, Max: 98.3 (06-26 @ 05:18)  HR: 68 (66 - 73)  BP: 128/77 (116/64 - 133/65)  BP(mean): 94 (81 - 94)  RR: 16 (9 - 27)  SpO2: 97% (97% - 100%)    OBJECTIVE:   PHYSICAL EXAM:  Focused Lower Extremity Physical Examination:   General: NAD, A&Ox3, WDWN  Lower extremity focus exam:  Vasc: Pedal pulses PT/DP non palpable b/l. Skin temperature is cool to cool from proximal to distal.  Derm: nonblanchable erythema noted to the right heel. No open wounds no drainage.   Neuro: deferred  Musc:  deferred    LABS:                          7.9    16.1  )-----------( 259      ( 25 Jun 2017 05:48 )             24.2       06-25    146<H>  |  110<H>  |  85<H>  ----------------------------<  166<H>  4.3   |  31  |  2.70<H>    Ca    8.9      25 Jun 2017 05:48  Phos  2.8     06-25  Mg     2.9     06-25    TPro  7.3  /  Alb  2.2<L>  /  TBili  0.2  /  DBili  x   /  AST  31  /  ALT  36  /  AlkPhos  160<H>  06-25        Wound Culture:               06-23 @ 18:58  --  --  .Urine Catheterized    Wound Culture:               06-23 @ 08:49  --  --  .Urine Catheterized    Wound Culture:               06-23 @ 08:47  --  --  .Blood Blood-Peripheral        finasteride 5milliGRAM(s) Oral daily  aspirin enteric coated 81milliGRAM(s) Oral daily  metoprolol 100milliGRAM(s) Oral every 12 hours  ALBUTerol    90 MICROgram(s) HFA Inhaler 2Puff(s) Inhalation every 6 hours PRN  pantoprazole   Suspension 40milliGRAM(s) Oral before breakfast  heparin  Injectable 5000Unit(s) SubCutaneous every 8 hours  piperacillin/tazobactam IVPB. 3.375Gram(s) IV Intermittent every 8 hours  dexamethasone    Solution 2milliGRAM(s) Oral every 12 hours  insulin glargine Injectable (LANTUS) 15Unit(s) SubCutaneous at bedtime  insulin lispro (HumaLOG) corrective regimen sliding scale  SubCutaneous every 6 hours  dextrose 5%. 1000milliLiter(s) IV Continuous <Continuous>  dextrose Gel 1Dose(s) Oral once PRN  dextrose 50% Injectable 12.5Gram(s) IV Push once  dextrose 50% Injectable 25Gram(s) IV Push once  dextrose 50% Injectable 25Gram(s) IV Push once  glucagon  Injectable 1milliGRAM(s) IntraMuscular once PRN

## 2017-06-26 NOTE — PROGRESS NOTE ADULT - ASSESSMENT
Right Stage 1 heel ulcer    Patient seen and elevated  Cont Offloading to the Right heel    will follow

## 2017-06-26 NOTE — PROGRESS NOTE ADULT - PROBLEM SELECTOR PLAN 1
BIPAP ,ABG monitoring ,pulm and icu attending followup Patient requires tracheostomy ,but he refuses it since august 2016  pt is DNR BIPAP ,ABG monitoring ,pulm followup  Capacity eval requested ,will discuss CMO initiation with the wife if patient found not to have a capacity Patient requires tracheostomy ,but he refuses it since august 2016   pt is DNR

## 2017-06-26 NOTE — PROGRESS NOTE ADULT - PROBLEM SELECTOR PLAN 4
multi-factorial  r/o cns mets  possible MRI on Monday multi-factorial  r/o cns mets  Neurology cons,likely needs MRI

## 2017-06-27 LAB
ALBUMIN SERPL ELPH-MCNC: 2.3 G/DL — LOW (ref 3.3–5)
ALP SERPL-CCNC: 162 U/L — HIGH (ref 40–120)
ALT FLD-CCNC: 29 U/L — SIGNIFICANT CHANGE UP (ref 12–78)
ANION GAP SERPL CALC-SCNC: 7 MMOL/L — SIGNIFICANT CHANGE UP (ref 5–17)
AST SERPL-CCNC: 16 U/L — SIGNIFICANT CHANGE UP (ref 15–37)
BASOPHILS # BLD AUTO: 0.1 K/UL — SIGNIFICANT CHANGE UP (ref 0–0.2)
BASOPHILS NFR BLD AUTO: 0.4 % — SIGNIFICANT CHANGE UP (ref 0–2)
BILIRUB SERPL-MCNC: 0.3 MG/DL — SIGNIFICANT CHANGE UP (ref 0.2–1.2)
BUN SERPL-MCNC: 61 MG/DL — HIGH (ref 7–23)
CALCIUM SERPL-MCNC: 8.8 MG/DL — SIGNIFICANT CHANGE UP (ref 8.5–10.1)
CHLORIDE SERPL-SCNC: 113 MMOL/L — HIGH (ref 96–108)
CO2 SERPL-SCNC: 29 MMOL/L — SIGNIFICANT CHANGE UP (ref 22–31)
CREAT SERPL-MCNC: 2.3 MG/DL — HIGH (ref 0.5–1.3)
EOSINOPHIL # BLD AUTO: 0.2 K/UL — SIGNIFICANT CHANGE UP (ref 0–0.5)
EOSINOPHIL NFR BLD AUTO: 1.6 % — SIGNIFICANT CHANGE UP (ref 0–6)
GLUCOSE SERPL-MCNC: 192 MG/DL — HIGH (ref 70–99)
HCT VFR BLD CALC: 25.7 % — LOW (ref 39–50)
HGB BLD-MCNC: 8.5 G/DL — LOW (ref 13–17)
INR BLD: 1.13 RATIO — SIGNIFICANT CHANGE UP (ref 0.88–1.16)
LYMPHOCYTES # BLD AUTO: 1.5 K/UL — SIGNIFICANT CHANGE UP (ref 1–3.3)
LYMPHOCYTES # BLD AUTO: 11.6 % — LOW (ref 13–44)
MAGNESIUM SERPL-MCNC: 2.9 MG/DL — HIGH (ref 1.6–2.6)
MCHC RBC-ENTMCNC: 33 GM/DL — SIGNIFICANT CHANGE UP (ref 32–36)
MCHC RBC-ENTMCNC: 35.4 PG — HIGH (ref 27–34)
MCV RBC AUTO: 107.2 FL — HIGH (ref 80–100)
MONOCYTES # BLD AUTO: 0.8 K/UL — SIGNIFICANT CHANGE UP (ref 0–0.9)
MONOCYTES NFR BLD AUTO: 6.2 % — SIGNIFICANT CHANGE UP (ref 1–9)
NEUTROPHILS # BLD AUTO: 10.4 K/UL — HIGH (ref 1.8–7.4)
NEUTROPHILS NFR BLD AUTO: 80.2 % — HIGH (ref 43–77)
PHOSPHATE SERPL-MCNC: 2.6 MG/DL — SIGNIFICANT CHANGE UP (ref 2.5–4.5)
PLATELET # BLD AUTO: 308 K/UL — SIGNIFICANT CHANGE UP (ref 150–400)
POTASSIUM SERPL-MCNC: 4.1 MMOL/L — SIGNIFICANT CHANGE UP (ref 3.5–5.3)
POTASSIUM SERPL-SCNC: 4.1 MMOL/L — SIGNIFICANT CHANGE UP (ref 3.5–5.3)
PROCALCITONIN SERPL-MCNC: 0.34 NG/ML — HIGH (ref 0–0.04)
PROT SERPL-MCNC: 7.5 G/DL — SIGNIFICANT CHANGE UP (ref 6–8.3)
PROTHROM AB SERPL-ACNC: 12.4 SEC — SIGNIFICANT CHANGE UP (ref 9.8–12.7)
RBC # BLD: 2.39 M/UL — LOW (ref 4.2–5.8)
RBC # FLD: 12.4 % — SIGNIFICANT CHANGE UP (ref 10.3–14.5)
SODIUM SERPL-SCNC: 149 MMOL/L — HIGH (ref 135–145)
VANCOMYCIN TROUGH SERPL-MCNC: 8.8 UG/ML — LOW (ref 10–20)
WBC # BLD: 12.9 K/UL — HIGH (ref 3.8–10.5)
WBC # FLD AUTO: 12.9 K/UL — HIGH (ref 3.8–10.5)

## 2017-06-27 PROCEDURE — 71010: CPT | Mod: 26

## 2017-06-27 PROCEDURE — 99232 SBSQ HOSP IP/OBS MODERATE 35: CPT

## 2017-06-27 RX ADMIN — Medication 2: at 00:55

## 2017-06-27 RX ADMIN — PIPERACILLIN AND TAZOBACTAM 25 GRAM(S): 4; .5 INJECTION, POWDER, LYOPHILIZED, FOR SOLUTION INTRAVENOUS at 21:47

## 2017-06-27 RX ADMIN — Medication 100 MILLIGRAM(S): at 18:43

## 2017-06-27 RX ADMIN — Medication 2: at 18:44

## 2017-06-27 RX ADMIN — Medication 81 MILLIGRAM(S): at 12:08

## 2017-06-27 RX ADMIN — Medication 2 MILLIGRAM(S): at 18:45

## 2017-06-27 RX ADMIN — FINASTERIDE 5 MILLIGRAM(S): 5 TABLET, FILM COATED ORAL at 12:08

## 2017-06-27 RX ADMIN — Medication 3: at 12:07

## 2017-06-27 RX ADMIN — HEPARIN SODIUM 5000 UNIT(S): 5000 INJECTION INTRAVENOUS; SUBCUTANEOUS at 15:27

## 2017-06-27 RX ADMIN — PANTOPRAZOLE SODIUM 40 MILLIGRAM(S): 20 TABLET, DELAYED RELEASE ORAL at 06:11

## 2017-06-27 RX ADMIN — Medication 2 MILLIGRAM(S): at 06:11

## 2017-06-27 RX ADMIN — INSULIN GLARGINE 15 UNIT(S): 100 INJECTION, SOLUTION SUBCUTANEOUS at 21:47

## 2017-06-27 RX ADMIN — PIPERACILLIN AND TAZOBACTAM 25 GRAM(S): 4; .5 INJECTION, POWDER, LYOPHILIZED, FOR SOLUTION INTRAVENOUS at 10:23

## 2017-06-27 RX ADMIN — Medication 2: at 06:57

## 2017-06-27 RX ADMIN — HEPARIN SODIUM 5000 UNIT(S): 5000 INJECTION INTRAVENOUS; SUBCUTANEOUS at 06:11

## 2017-06-27 RX ADMIN — Medication 100 MILLIGRAM(S): at 06:11

## 2017-06-27 RX ADMIN — HEPARIN SODIUM 5000 UNIT(S): 5000 INJECTION INTRAVENOUS; SUBCUTANEOUS at 21:47

## 2017-06-27 RX ADMIN — PIPERACILLIN AND TAZOBACTAM 25 GRAM(S): 4; .5 INJECTION, POWDER, LYOPHILIZED, FOR SOLUTION INTRAVENOUS at 15:27

## 2017-06-27 NOTE — PROGRESS NOTE ADULT - SUBJECTIVE AND OBJECTIVE BOX
Patient is a 57y Male with past medical history of    ckd       presenting with   zacarias     who reports no complaints overnight.    REVIEW OF SYSTEMS:    CONSTITUTIONAL: No weakness, fevers or chills    Allergies    No Known Allergies    Intolerances        MEDICATIONS  (STANDING):  finasteride 5 milliGRAM(s) Oral daily  aspirin enteric coated 81 milliGRAM(s) Oral daily  metoprolol 100 milliGRAM(s) Oral every 12 hours  pantoprazole   Suspension 40 milliGRAM(s) Oral before breakfast  heparin  Injectable 5000 Unit(s) SubCutaneous every 8 hours  piperacillin/tazobactam IVPB. 3.375 Gram(s) IV Intermittent every 8 hours  dexamethasone    Solution 2 milliGRAM(s) Oral every 12 hours  insulin glargine Injectable (LANTUS) 15 Unit(s) SubCutaneous at bedtime  insulin lispro (HumaLOG) corrective regimen sliding scale   SubCutaneous every 6 hours  dextrose 5%. 1000 milliLiter(s) (50 mL/Hr) IV Continuous <Continuous>  dextrose 50% Injectable 12.5 Gram(s) IV Push once  dextrose 50% Injectable 25 Gram(s) IV Push once  dextrose 50% Injectable 25 Gram(s) IV Push once      Vitals:  T(F): 98 (06-27-17 @ 13:40), Max: 98.4 (06-26-17 @ 22:02)  HR: 74 (06-27-17 @ 13:40)  BP: 122/76 (06-27-17 @ 13:40)  BP(mean): --  RR: 17 (06-27-17 @ 14:49)  SpO2: 99% (06-27-17 @ 14:49)  Wt(kg): --    I and O's:    06-27 @ 07:01  -  06-27 @ 16:20  --------------------------------------------------------  IN: 775 mL / OUT: 0 mL / NET: 775 mL            PHYSICAL EXAM:    Constitutional: NAD,   Neck: No JVD  Respiratory: CTAB  Cardiovascular: S1 and S2  Gastrointestinal: BS+, soft, NT/ND  Extremities: No peripheral edema  Neurological: , no focal deficits    LABS:                        8.5    12.9  )-----------( 308      ( 27 Jun 2017 06:57 )             25.7       149    |  113    |  61     ----------------------------<  192       27 Jun 2017 06:57  4.1     |  29     |  2.30     146    |  110    |  85     ----------------------------<  166       25 Jun 2017 05:48  4.3     |  31     |  2.70     145    |  109    |  92     ----------------------------<  222       24 Jun 2017 06:45  4.4     |  32     |  3.10     Ca    8.8        27 Jun 2017 06:57  Ca    8.9        25 Jun 2017 05:48    Phos  2.6       27 Jun 2017 06:57  Phos  2.8       25 Jun 2017 05:48    Mg     2.9       27 Jun 2017 06:57  Mg     2.9       25 Jun 2017 05:48    TPro  7.5    /  Alb  2.3    /  TBili  0.3    /        27 Jun 2017 06:57  DBili  x      /  AST  16     /  ALT  29     /  AlkPhos  162      TPro  7.3    /  Alb  2.2    /  TBili  0.2    /        25 Jun 2017 05:48  DBili  x      /  AST  31     /  ALT  36     /  AlkPhos  160          Serum Osmo:   Serum Uric Acid:   MATTHEW:   Double Stranded DNA:   C3:   C3 Nephritic Factor:   C4:   p-ANCA:   c-ANCA:   Anti-GBM:   WANDA-Smith Antibody:   Immunofixation:   [place]/Lambda Free Light Chain:   SPEP:   Hepatitis Panel:   HIV-1/2:     Urine Studies:      Urine chemistry:   Urine Na: Sodium, Random Urine: <20 mmol/L (06-23 @ 20:29)    Urine Creatinine: Creatinine, Random Urine: 84 mg/dL (06-23 @ 20:29)    Urine Protein/Cr ratio:  Urine K:   Urine Osm:   24 Hr urine studies:     24 hr urine Creatinine Clearance:    RADIOLOGY & ADDITIONAL STUDIES:

## 2017-06-27 NOTE — PROGRESS NOTE ADULT - ASSESSMENT
Right Stage 1 heel ulcer    Patient seen and elevated  Cont Offloading to the Right heel  Cont palliative local wound care

## 2017-06-27 NOTE — PROGRESS NOTE ADULT - PROBLEM SELECTOR PLAN 10
Palliative care consult requested ,to discuss advance directives and complete MOLST CMO would be appropriate level of care at this point Palliative care consult requested ,to discuss advance directives and complete MOLST CMO would be appropriate level of care at this point Wife is in agreement with limited med intervention/cmo level of care But would like antibiotics,iv fluids and oxygen supply to be administrated as needed as part of CMO

## 2017-06-27 NOTE — PROGRESS NOTE ADULT - PROBLEM SELECTOR PROBLEM 7
Osteomyelitis of ankle or foot
Hypertension

## 2017-06-27 NOTE — PROGRESS NOTE ADULT - SUBJECTIVE AND OBJECTIVE BOX
Patient seen and examined today Plan discussed/Questions answered  (with patient/ancillary staff/colleagues) Chart notes reviewd More detailed Pulm/Critical Care notes, assessment and plan  will be added later today as needed after reviewing further labs as they become available     Notable interval events reviewed    ROS/PE  . REVIEW OF SYMPTOMS    Able to give ROS  Yes      RELIABLE NO   Weakness Yes   Chills No   Vision changes No  Lymph nodes No enlarged glands   Endocrine No unexplained hair loss No het or cold intolerance   Allergy No hives   Sore throat No   Coughing blood No  Headache No  Confusion YES  Chest pain No  Palpitations No   Pain abdomen NO   Shortness of breath YES  Vomiting NO  Pain neck No  Pain abdomen NO     PHYSICAL EXAM    HEENT Unremarkable PERRLA atraumatic  RESP Fair air entry EXP prolonged    Harsh breath sound Resp distres mild  CARDIAC S1 S2 No S3     NO JVD   Awake Alert and ORIENTED x tw  ABDOMEN SOFT BS PRESENT NOT DISTENDED No hepatosplenomegaly  PEDAL EDEMA present No calf tenderness  NO rash GENERAL Not TOXIC looking  . Patient seen and examined today Plan discussed/Questions answered  (with patient/ancillary staff/colleagues) Chart notes reviewd More detailed Pulm/Critical Care notes, assessment and plan  will be added later today as needed after reviewing further labs as they become available     Notable interval events reviewed    ROS/PE  . REVIEW OF SYMPTOMS    Able to give ROS  Yes      RELIABLE NO   Weakness Yes   Chills No   Vision changes No  Lymph nodes No enlarged glands   Endocrine No unexplained hair loss No het or cold intolerance   Allergy No hives   Sore throat No   Coughing blood No  Headache No  Confusion YES  Chest pain No  Palpitations No   Pain abdomen NO   Shortness of breath YES  Vomiting NO  Pain neck No  Pain abdomen NO     PHYSICAL EXAM    HEENT Unremarkable PERRLA atraumatic  RESP Fair air entry EXP prolonged    Harsh breath sound Resp distres mild  CARDIAC S1 S2 No S3     NO JVD   Awake Alert and ORIENTED x tw  ABDOMEN SOFT BS PRESENT NOT DISTENDED No hepatosplenomegaly  PEDAL EDEMA present No calf tenderness  NO rash GENERAL Not TOXIC looking  . 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  JYOTI (on CPAP  14), PVD smoker who presented from [facility] with resp distress and fever T 101 In ER noted to have insp stridor ABG on 100% (9a) 734/54/92 and was admitted to [facility] P ICU 6/23/2017 6/25 I dw pt's spouse and she told me pt absolutely declines trach even if it is lifesaving situation Pt is dnr and prognosis is poor He was moved to reg floor 6/25 and plan is for MR brain to look for mets He got noct BPAP 16/8/.4 for JYOTI and for resp failure and was Rxed with zosyn (6/24) for asp pneum and decadron taper for uao and was given Glucerna PEG feeds CMO being considered  VITALS/LABS  6/27/2017 afeb 74 120/70 18 18   6/27/2017 W 12.9 Hb 8.5 Plt 308  Na 149 K 4.1 CO2 29 Cr 2.3   PROBLEM ASSESSMENT PLAN   UAO  6/23 Seen by ENT HO SCC R tonsil sp RT (not told well) sp Chemo last 10/2016) Need trach which he has declined   On  Decadron 2.2 (6/25)   RESP   6/24 6a BPAP 16/8/14/.4 736/52/142   Monitor PO ABG Target PO 90-96%   ASPIRATION PNEUMONIA   6/23-6/24-6/25/2017 W 24.5-17.2-16.1  6/24-6/27 pct 1.56-.34 6/23 bc n 6/23 uc n   6/27 CXR NAPD   6/23 LA 1.6-1.1  6/22/2017 CXR lll infiltr  zosyn (6/23)   COPD   albuterol hfa.4p (6/23)   CAD  ASA 81 (6/23) metoprolol 100.2 (6/23)   CARMEL   8/9/2016-6/23-6/24-6/25-6/27/2017 Cr 3.5-3.9-3.1-2.7-2.3     6/23 Susan Below 20   ANEMIA LIKELY SEC CKD   8/9/2016-6/23-6/24-6/25-6/27/2017  Hb 8.1-8.5-7.3-7.9-8.5  OBTUNDATION   MR did not reveal any lesions   mr being planned for 6/26 Improved   GLOBAL ISSUE/BEST PRACTICE:        PROBLEM:      Analgesia:     na                        PROBLEM: Sedation:     na               PROBLEM: HOB elevation:   yes             PROBLEM: Stress ulcer proph:    protonix 40 (6/24)                       PROBLEM: VTE prophylaxis:      hsc (6/23)                        PROBLEM: Glycemic control:    iss (6/23)             PROBLEM: Nutrition:    Glucerna 1.2 1680 (6/24)                       PROBLEM: Advanced directive: dnr     PROBLEM: Allergies:  na  TIME SPENT Over 25 minutes aggregate time spent on encounter; activities included   direct patient care, counseling and/or coordinating care reviewing notes, lab data/ imaging , discussion with multidisciplinary team/ patient  /family. Risks, benefits, alternatives  discussed in detail. Questions/concerns  were addressed  to the best of my ability .

## 2017-06-27 NOTE — PROGRESS NOTE ADULT - SUBJECTIVE AND OBJECTIVE BOX
Neurology follow up note    ANU [id number]      Interval History:    Patient feels ok no new complaints.    MEDICATIONS    finasteride 5 milliGRAM(s) Oral daily  aspirin enteric coated 81 milliGRAM(s) Oral daily  metoprolol 100 milliGRAM(s) Oral every 12 hours  ALBUTerol    90 MICROgram(s) HFA Inhaler 2 Puff(s) Inhalation every 6 hours PRN  pantoprazole   Suspension 40 milliGRAM(s) Oral before breakfast  heparin  Injectable 5000 Unit(s) SubCutaneous every 8 hours  piperacillin/tazobactam IVPB. 3.375 Gram(s) IV Intermittent every 8 hours  dexamethasone    Solution 2 milliGRAM(s) Oral every 12 hours  insulin glargine Injectable (LANTUS) 15 Unit(s) SubCutaneous at bedtime  insulin lispro (HumaLOG) corrective regimen sliding scale   SubCutaneous every 6 hours  dextrose 5%. 1000 milliLiter(s) IV Continuous <Continuous>  dextrose Gel 1 Dose(s) Oral once PRN  dextrose 50% Injectable 12.5 Gram(s) IV Push once  dextrose 50% Injectable 25 Gram(s) IV Push once  dextrose 50% Injectable 25 Gram(s) IV Push once  glucagon  Injectable 1 milliGRAM(s) IntraMuscular once PRN      Allergies    No Known Allergies    Intolerances            Vital Signs Last 24 Hrs  T(C): 36.7 (27 Jun 2017 13:40), Max: 36.9 (26 Jun 2017 22:02)  T(F): 98 (27 Jun 2017 13:40), Max: 98.4 (26 Jun 2017 22:02)  HR: 74 (27 Jun 2017 13:40) (69 - 81)  BP: 122/76 (27 Jun 2017 13:40) (122/76 - 169/87)  BP(mean): --  RR: 17 (27 Jun 2017 14:49) (17 - 19)  SpO2: 99% (27 Jun 2017 14:49) (99% - 100%)      REVIEW OF SYSTEMS: Non Verbal  Limit or unable to obtain secondary to patient's poor mental status.    Constitutional: No fever, chills, fatigue, weakness  Eyes: no eye pain, visual disturbances, or discharge  ENT:  No difficulty hearing, tinnitus, vertigo; No sinus or throat pain  Neck: No pain or stiffness  Respiratory: No cough, dyspnea, wheezing   Cardiovascular: No chest pain, palpitations,   Gastrointestinal: No abdominal or epigastric pain. No nausea, vomiting  No diarrhea or constipation.   Genitourinary: No dysuria, frequency, hematuria or incontinence  Neurological: No headaches, lightheadedness, vertigo, numbness or tremors  Psychiatric: No depression, anxiety, mood swings or difficulty sleeping  Musculoskeletal: No joint pain or swelling; No muscle, back or extremity pain  Skin: No itching, burning, rashes or lesions   Lymph Nodes: No enlarged glands  Endocrine: No heat or cold intolerance; No hair loss   Allergy and Immunologic: No hives or eczema    On Neurological Examination:    Mental Status - Patient is alert, awake, Confused       Follow simple commands      Speech -   Fluent                     Cranial Nerves - Pupils 3 mm equal and reactive to light,   extraocular eye movements intact.   smile symmetric  intact bilateral NLF    Motor Exam -   Right upper 4/5  Left upper 4/5  Right lower 1/5  Left lower  1/5      Muscle tone - is normal all over.  No asymmetry is seen.        GENERAL Exam: Nontoxic , No Acute Distress   	  HEENT:  normocephalic, atraumatic  		  LUNGS:   Decreased bilaterally  	  HEART: Normal S1S2   No murmur RRR        	  GI/ ABDOMEN:  Soft  Non tender    EXTREMITIES:   No Edema  No Clubbing  No Cyanosis No Edema    MUSCULOSKELETAL: Normal Range of Motion  	   SKIN: Normal  No Ecchymosis               LABS:  CBC Full  -  ( 27 Jun 2017 06:57 )  WBC Count : 12.9 K/uL  Hemoglobin : 8.5 g/dL  Hematocrit : 25.7 %  Platelet Count - Automated : 308 K/uL  Mean Cell Volume : 107.2 fl  Mean Cell Hemoglobin : 35.4 pg  Mean Cell Hemoglobin Concentration : 33.0 gm/dL  Auto Neutrophil # : 10.4 K/uL  Auto Lymphocyte # : 1.5 K/uL  Auto Monocyte # : 0.8 K/uL  Auto Eosinophil # : 0.2 K/uL  Auto Basophil # : 0.1 K/uL  Auto Neutrophil % : 80.2 %  Auto Lymphocyte % : 11.6 %  Auto Monocyte % : 6.2 %  Auto Eosinophil % : 1.6 %  Auto Basophil % : 0.4 %      06-27    149<H>  |  113<H>  |  61<H>  ----------------------------<  192<H>  4.1   |  29  |  2.30<H>    Ca    8.8      27 Jun 2017 06:57  Phos  2.6     06-27  Mg     2.9     06-27    TPro  7.5  /  Alb  2.3<L>  /  TBili  0.3  /  DBili  x   /  AST  16  /  ALT  29  /  AlkPhos  162<H>  06-27    Hemoglobin A1C:     LIVER FUNCTIONS - ( 27 Jun 2017 06:57 )  Alb: 2.3 g/dL / Pro: 7.5 g/dL / ALK PHOS: 162 U/L / ALT: 29 U/L / AST: 16 U/L / GGT: x           Vitamin B12   PT/INR - ( 27 Jun 2017 06:57 )   PT: 12.4 sec;   INR: 1.13 ratio               RADIOLOGY  ANALYSIS AND PLAN:  This is a 57-year-old with episodes of change in mental status.  1.	For change in mental status, suspect most likely metabolic encephalopathy, multifactorial secondary to acute renal failure along with hypoxia.  The patient will undergo MR imaging of the brain was limited no contrast but was negative for central lesion.  2.	Monitor renal function.  3.	Monitor oxygen saturation.  4.	For history of diabetic neuropathy, supportive therapy.  5.	I spoke with spouse on phone 6/27/17 she understands overall long term prognosis is poor -- comfort care which is reasonable       Thank you for the courtesy of consultation.    30 minutes spent on total encounter; more than 50% of the visit was spent counseling and/or coordinating care by the attending physician.

## 2017-06-27 NOTE — PROGRESS NOTE ADULT - SUBJECTIVE AND OBJECTIVE BOX
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)  Respiratory failure (J96.90)  Renal failure (N19)  JYOTI on CPAP (G47.33)  Heel ulcer due to DM (E11.621)  Hypertension (I10)  Diabetes mellitus (E11.9)  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)  Pneumonia due to infectious organism, unspecified laterality, unspecified part of lung (J18.9)    HPI:  56 yo AAM ,[facility] resident with hx of DM,OBESITY,JYOTI,HTN,OM,DVT,R heel ulcer,PVD,R tonsillar ca ,brought to ER with fever 101.8 and sob ,he  was diagnosed with acute respiratory failure and found to have stridor and upper airway obstuction ,admitted to ICU ,placed on BIPAP and ENT eval requested 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 He did not undergo any surgery as he was a poor candidate. Thereafter, he was sent to a rehab facility where his status worsened overtime,he became LTC resident and was refusing tracheostomy which was offered few times  For the last few months, his mental and functional status has deteriorated He lost 150 lbs( his weight was 400 lbs when he was diagnosed with ca)  He has not been very responsive for about 10 days . He had a CT neck in May 2017 which showed severe airway obstruction. Patient was told he  needs a tracheostomy since he was diagnosed with SCC last August  He was started on augmentin recently by Dr Kay due to prorbale pneumonia and transferred to ER this morning due to fever and dyspnea Patient is not able to provide any hx in ER due to unresposive state and respiratory distress Admitted to ICU Palliative care consult requested ,to discuss advance directives and complete MOLST ,HCP requests everyhtning to be done ,but aware of patient's wishes not to have tracheostomy and in agreement with his decision Patient also found to be in ARF and to be hyperglycemic (23 Jun 2017 07:52)      REVIEW OF SYSTEMS:    CONSTITUTIONAL: No fever, weight loss, or fatigue  EYES: No eye pain, visual disturbances, or discharge  ENMT:  No difficulty hearing, tinnitus, vertigo; No sinus or throat pain  NECK: No pain or stiffness  BREASTS: No pain, masses, or nipple discharge  RESPIRATORY: No cough, wheezing, chills or hemoptysis; No shortness of breath  CARDIOVASCULAR: No chest pain, palpitations, dizziness, or leg swelling  GASTROINTESTINAL: No abdominal or epigastric pain. No nausea, vomiting, or hematemesis; No diarrhea or constipation. No melena or hematochezia.  GENITOURINARY: No dysuria, frequency, hematuria, or incontinence  NEUROLOGICAL: No headaches, memory loss, loss of strength, numbness, or tremors  SKIN: No itching, burning, rashes, or lesions   LYMPH NODES: No enlarged glands  ENDOCRINE: No heat or cold intolerance; No hair loss  MUSCULOSKELETAL: No joint pain or swelling; No muscle, back, or extremity pain  PSYCHIATRIC: No depression, anxiety, mood swings, or difficulty sleeping  HEME/LYMPH: No easy bruising, or bleeding gums  ALLERGY AND IMMUNOLOGIC: No hives or eczema    MEDICATIONS  (STANDING):  finasteride 5 milliGRAM(s) Oral daily  aspirin enteric coated 81 milliGRAM(s) Oral daily  metoprolol 100 milliGRAM(s) Oral every 12 hours  pantoprazole   Suspension 40 milliGRAM(s) Oral before breakfast  heparin  Injectable 5000 Unit(s) SubCutaneous every 8 hours  piperacillin/tazobactam IVPB. 3.375 Gram(s) IV Intermittent every 8 hours  dexamethasone    Solution 2 milliGRAM(s) Oral every 12 hours  insulin glargine Injectable (LANTUS) 15 Unit(s) SubCutaneous at bedtime  insulin lispro (HumaLOG) corrective regimen sliding scale   SubCutaneous every 6 hours  dextrose 5%. 1000 milliLiter(s) (50 mL/Hr) IV Continuous <Continuous>  dextrose 50% Injectable 12.5 Gram(s) IV Push once  dextrose 50% Injectable 25 Gram(s) IV Push once  dextrose 50% Injectable 25 Gram(s) IV Push once    MEDICATIONS  (PRN):  ALBUTerol    90 MICROgram(s) HFA Inhaler 2 Puff(s) Inhalation every 6 hours PRN Shortness of Breath and/or Wheezing  dextrose Gel 1 Dose(s) Oral once PRN Blood Glucose LESS THAN 70 milliGRAM(s)/deciliter  glucagon  Injectable 1 milliGRAM(s) IntraMuscular once PRN Glucose LESS THAN 70 milligrams/deciliter      ALLERGIES: No Known Allergies      FAMILY HISTORY:  Family history of embolic stroke (Uncle)  Family history of acute myocardial infarction (Uncle)  Family history of hypertension (Grandparent, Uncle)  Family history of diabetes mellitus (DM)      PHYSICAL EXAMINATION:  -----------------------------  T(C): 36.8 (06-27-17 @ 04:50), Max: 36.9 (06-26-17 @ 22:02)  HR: 81 (06-27-17 @ 04:50) (67 - 81)  BP: 169/87 (06-27-17 @ 04:50) (135/80 - 169/87)  RR: 18 (06-27-17 @ 04:50) (16 - 19)  SpO2: 99% (06-27-17 @ 04:50) (99% - 100%)  Wt(kg): --        Constitutional: well developed, normal appearance, well groomed, well nourished, no deformities and no acute distress.   Eyes: the conjunctiva exhibited no abnormalities and the eyelids demonstrated no xanthelasmas.   HEENT: normal oral mucosa, no oral pallor and no oral cyanosis.   Neck: normal jugular venous A waves present, normal jugular venous V waves present and no jugular venous marrufo A waves.   Pulmonary: no respiratory distress, normal respiratory rhythm and effort, no accessory muscle use and lungs were clear to auscultation bilaterally.   Cardiovascular: heart rate and rhythm were normal, normal S1 and S2 and no murmur, gallop, rub, heave or thrill are present.   Abdomen: soft, non-tender, no hepato-splenomegaly and no abdominal mass palpated.   Musculoskeletal: the gait could not be assessed..   Extremities: no clubbing of the fingernails, no localized cyanosis, no petechial hemorrhages and no ischemic changes.   Skin: normal skin color and pigmentation, no rash, no venous stasis, no skin lesions, no skin ulcer and no xanthoma was observed.   Psychiatric: oriented to person, place, and time, the affect was normal, the mood was normal and not feeling anxious.     LABS:   --------  06-27    149<H>  |  113<H>  |  61<H>  ----------------------------<  192<H>  4.1   |  29  |  2.30<H>    Ca    8.8      27 Jun 2017 06:57  Phos  2.6     06-27  Mg     2.9     06-27    TPro  7.5  /  Alb  2.3<L>  /  TBili  0.3  /  DBili  x   /  AST  16  /  ALT  29  /  AlkPhos  162<H>  06-27                         8.5    12.9  )-----------( 308      ( 27 Jun 2017 06:57 )             25.7     PT/INR - ( 27 Jun 2017 06:57 )   PT: 12.4 sec;   INR: 1.13 ratio                     RADIOLOGY:  -----------------        ECG:

## 2017-06-27 NOTE — PROGRESS NOTE ADULT - ASSESSMENT
Patient is 1) cognitively impaired 2)  Does not understand the nature of the medical condition, risks, benefits, alternatives and side-effects of treatment 3) Is unable to make decisions voluntarily.  At this time patient has no decision making capacity regarding medical decisions.

## 2017-06-27 NOTE — PROGRESS NOTE ADULT - PROBLEM SELECTOR PROBLEM 10
Palliative care by specialist

## 2017-06-27 NOTE — PROGRESS NOTE ADULT - SUBJECTIVE AND OBJECTIVE BOX
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  Osteomyelitis of ankle or foot  Diabetic retinopathy associated with type 2 diabetes mellitus: Insulin  Lumbago  Neuropathy in diabetes  Tobacco dependence  Cellulitis  JYOTI on CPAP  Hypertension  Obesity  DVT (deep venous thrombosis)  Diabetes mellitus  No significant past surgical history    HEALTH ISSUES - PROBLEM Dx:  Metabolic encephalopathy: Metabolic encephalopathy  Tonsillar neoplasm: Tonsillar neoplasm  DVT (deep venous thrombosis): DVT (deep venous thrombosis)  Sepsis: Sepsis  Acute respiratory failure: Acute respiratory failure  Head and neck swelling: Head and neck swelling  Type 2 diabetes mellitus with other neurologic complication, without long-term current use of insulin: Type 2 diabetes mellitus with other neurologic complication, without long-term current use of insulin  Encephalopathy, unspecified: Encephalopathy, unspecified  CARMEL (acute kidney injury): CARMEL (acute kidney injury)  Palliative care by specialist: Palliative care by specialist  Prophylactic measure: Prophylactic measure  Neuropathy in diabetes: Neuropathy in diabetes  Obesity: Obesity  Osteomyelitis of ankle or foot: Osteomyelitis of ankle or foot  Respiratory failure: Respiratory failure  Renal failure: Renal failure  JYOTI on CPAP: JYOTI on CPAP  Heel ulcer due to DM: Heel ulcer due to DM  Hypertension: Hypertension  Diabetes mellitus: Diabetes mellitus  Squamous cell carcinoma of tonsil: Squamous cell carcinoma of tonsil  Pneumonia due to infectious organism, unspecified laterality, unspecified part of lung: Pneumonia due to infectious organism, unspecified laterality, unspecified part of lung          MEDICATIONS  (STANDING):  finasteride 5 milliGRAM(s) Oral daily  aspirin enteric coated 81 milliGRAM(s) Oral daily  metoprolol 100 milliGRAM(s) Oral every 12 hours  pantoprazole   Suspension 40 milliGRAM(s) Oral before breakfast  heparin  Injectable 5000 Unit(s) SubCutaneous every 8 hours  piperacillin/tazobactam IVPB. 3.375 Gram(s) IV Intermittent every 8 hours  dexamethasone    Solution 2 milliGRAM(s) Oral every 12 hours  insulin glargine Injectable (LANTUS) 15 Unit(s) SubCutaneous at bedtime  insulin lispro (HumaLOG) corrective regimen sliding scale   SubCutaneous every 6 hours  dextrose 5%. 1000 milliLiter(s) (50 mL/Hr) IV Continuous <Continuous>  dextrose 50% Injectable 12.5 Gram(s) IV Push once  dextrose 50% Injectable 25 Gram(s) IV Push once  dextrose 50% Injectable 25 Gram(s) IV Push once    MEDICATIONS  (PRN):  ALBUTerol    90 MICROgram(s) HFA Inhaler 2 Puff(s) Inhalation every 6 hours PRN Shortness of Breath and/or Wheezing  dextrose Gel 1 Dose(s) Oral once PRN Blood Glucose LESS THAN 70 milliGRAM(s)/deciliter  glucagon  Injectable 1 milliGRAM(s) IntraMuscular once PRN Glucose LESS THAN 70 milligrams/deciliter      OBJECTIVE    T(C): 36.8 (06-27-17 @ 04:50), Max: 36.9 (06-26-17 @ 22:02)  HR: 81 (06-27-17 @ 04:50) (67 - 81)  BP: 169/87 (06-27-17 @ 04:50) (135/80 - 169/87)  RR: 18 (06-27-17 @ 04:50) (16 - 19)  SpO2: 99% (06-27-17 @ 04:50) (99% - 100%)  Wt(kg): --  I&O's Summary        REVIEW OF SYSTEMS:  ROS is unobtainable dur to lethargy and disoriented state  PHYSICAL EXAM:  Appearance: NAD. vss   HEENT:   Moist oral mucosa. Conjunctiva clear b/l.   Neck : supple   Cardiovascular: RRR ,S1S2 present  Respiratory: Lungs CTAB,no stridor   Gastrointestinal:  Soft, nontender. No rebound. PEG in place   Skin: No rashes, No ecchymoses, No cyanosis.	  Neurologic: Non-focal. Moving all extremities. Following commands.  Extremities: No edema. No erythema. No calf tenderness.  LABS:                        8.5    12.9  )-----------( 308      ( 27 Jun 2017 06:57 )             25.7     06-27    149<H>  |  113<H>  |  61<H>  ----------------------------<  192<H>  4.1   |  29  |  2.30<H>    Ca    8.8      27 Jun 2017 06:57  Phos  2.6     06-27  Mg     2.9     06-27    TPro  7.5  /  Alb  2.3<L>  /  TBili  0.3  /  DBili  x   /  AST  16  /  ALT  29  /  AlkPhos  162<H>  06-27    PT/INR - ( 27 Jun 2017 06:57 )   PT: 12.4 sec;   INR: 1.13 ratio               RADIOLOGY & ADDITIONAL TESTS:    ASSESSMENT/PLAN: PROGRESS NOTE    OVERNIGHT  No new issues reported by medical staff . 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  Osteomyelitis of ankle or foot  Diabetic retinopathy associated with type 2 diabetes mellitus: Insulin  Lumbago  Neuropathy in diabetes  Tobacco dependence  Cellulitis  JYOTI on CPAP  Hypertension  Obesity  DVT (deep venous thrombosis)  Diabetes mellitus  No significant past surgical history    HEALTH ISSUES - PROBLEM Dx:  Metabolic encephalopathy: Metabolic encephalopathy  Tonsillar neoplasm: Tonsillar neoplasm  DVT (deep venous thrombosis): DVT (deep venous thrombosis)  Sepsis: Sepsis  Acute respiratory failure: Acute respiratory failure  Head and neck swelling: Head and neck swelling  Type 2 diabetes mellitus with other neurologic complication, without long-term current use of insulin: Type 2 diabetes mellitus with other neurologic complication, without long-term current use of insulin  Encephalopathy, unspecified: Encephalopathy, unspecified  CARMEL (acute kidney injury): CARMEL (acute kidney injury)  Palliative care by specialist: Palliative care by specialist  Prophylactic measure: Prophylactic measure  Neuropathy in diabetes: Neuropathy in diabetes  Obesity: Obesity  Osteomyelitis of ankle or foot: Osteomyelitis of ankle or foot  Respiratory failure: Respiratory failure  Renal failure: Renal failure  JYOTI on CPAP: JYOTI on CPAP  Heel ulcer due to DM: Heel ulcer due to DM  Hypertension: Hypertension  Diabetes mellitus: Diabetes mellitus  Squamous cell carcinoma of tonsil: Squamous cell carcinoma of tonsil  Pneumonia due to infectious organism, unspecified laterality, unspecified part of lung: Pneumonia due to infectious organism, unspecified laterality, unspecified part of lung          MEDICATIONS  (STANDING):  finasteride 5 milliGRAM(s) Oral daily  aspirin enteric coated 81 milliGRAM(s) Oral daily  metoprolol 100 milliGRAM(s) Oral every 12 hours  pantoprazole   Suspension 40 milliGRAM(s) Oral before breakfast  heparin  Injectable 5000 Unit(s) SubCutaneous every 8 hours  piperacillin/tazobactam IVPB. 3.375 Gram(s) IV Intermittent every 8 hours  dexamethasone    Solution 2 milliGRAM(s) Oral every 12 hours  insulin glargine Injectable (LANTUS) 15 Unit(s) SubCutaneous at bedtime  insulin lispro (HumaLOG) corrective regimen sliding scale   SubCutaneous every 6 hours  dextrose 5%. 1000 milliLiter(s) (50 mL/Hr) IV Continuous <Continuous>  dextrose 50% Injectable 12.5 Gram(s) IV Push once  dextrose 50% Injectable 25 Gram(s) IV Push once  dextrose 50% Injectable 25 Gram(s) IV Push once    MEDICATIONS  (PRN):  ALBUTerol    90 MICROgram(s) HFA Inhaler 2 Puff(s) Inhalation every 6 hours PRN Shortness of Breath and/or Wheezing  dextrose Gel 1 Dose(s) Oral once PRN Blood Glucose LESS THAN 70 milliGRAM(s)/deciliter  glucagon  Injectable 1 milliGRAM(s) IntraMuscular once PRN Glucose LESS THAN 70 milligrams/deciliter      OBJECTIVE    T(C): 36.8 (06-27-17 @ 04:50), Max: 36.9 (06-26-17 @ 22:02)  HR: 81 (06-27-17 @ 04:50) (67 - 81)  BP: 169/87 (06-27-17 @ 04:50) (135/80 - 169/87)  RR: 18 (06-27-17 @ 04:50) (16 - 19)  SpO2: 99% (06-27-17 @ 04:50) (99% - 100%)  Wt(kg): --  I&O's Summary        REVIEW OF SYSTEMS:  ROS is unobtainable dur to lethargy and disoriented state  PHYSICAL EXAM:  Appearance: NAD. vss   HEENT:   Moist oral mucosa. Conjunctiva clear b/l.   Neck : supple   Cardiovascular: RRR ,S1S2 present  Respiratory: Lungs CTAB,no stridor   Gastrointestinal:  Soft, nontender. No rebound. PEG in place   Skin: No rashes, No ecchymoses, No cyanosis.	  Neurologic: Non-focal. Moving all extremities. Following commands.  Extremities: No edema. No erythema. No calf tenderness.  LABS:                        8.5    12.9  )-----------( 308      ( 27 Jun 2017 06:57 )             25.7     06-27    149<H>  |  113<H>  |  61<H>  ----------------------------<  192<H>  4.1   |  29  |  2.30<H>    Ca    8.8      27 Jun 2017 06:57  Phos  2.6     06-27  Mg     2.9     06-27    TPro  7.5  /  Alb  2.3<L>  /  TBili  0.3  /  DBili  x   /  AST  16  /  ALT  29  /  AlkPhos  162<H>  06-27    PT/INR - ( 27 Jun 2017 06:57 )   PT: 12.4 sec;   INR: 1.13 ratio               RADIOLOGY & ADDITIONAL TESTS:    ASSESSMENT/PLAN:

## 2017-06-27 NOTE — PROGRESS NOTE ADULT - PROBLEM SELECTOR PLAN 9
Gastrointestina stress ulcer prophylaxis l and DVT prophylaxis administrated

## 2017-06-27 NOTE — PROGRESS NOTE ADULT - PROBLEM SELECTOR PLAN 4
multi-factorial  r/o cns mets  Neurology cons,likely needs MRI multi-factorial  Neurology cons followup

## 2017-06-27 NOTE — PROGRESS NOTE ADULT - SUBJECTIVE AND OBJECTIVE BOX
CHIEF COMPLAINT:    SUBJECTIVE: Pt seen and evaluated for Right heel ulcer.      Vital Signs Last 24 Hrs  T(C): 36.8 (27 Jun 2017 04:50), Max: 36.9 (26 Jun 2017 22:02)  T(F): 98.2 (27 Jun 2017 04:50), Max: 98.4 (26 Jun 2017 22:02)  HR: 81 (27 Jun 2017 04:50) (67 - 81)  BP: 169/87 (27 Jun 2017 04:50) (135/80 - 169/87)  BP(mean): --  RR: 18 (27 Jun 2017 04:50) (16 - 19)  SpO2: 99% (27 Jun 2017 04:50) (99% - 100%)    OBJECTIVE:   PHYSICAL EXAM:  Focused Lower Extremity Physical Examination:   General: NAD, A&Ox3, WDWN  Vasc: Pedal pulses PT/DP non palpable b/l. Skin temperature is cool to cool from proximal to distal.  Derm: nonblanchable erythema noted to the right heel. No open wounds no drainage.   Neuro: deferred  Musc:  deferred      LABS:                          8.5    12.9  )-----------( 308      ( 27 Jun 2017 06:57 )             25.7       06-27    149<H>  |  113<H>  |  61<H>  ----------------------------<  192<H>  4.1   |  29  |  2.30<H>    Ca    8.8      27 Jun 2017 06:57  Phos  2.6     06-27  Mg     2.9     06-27    TPro  7.5  /  Alb  2.3<L>  /  TBili  0.3  /  DBili  x   /  AST  16  /  ALT  29  /  AlkPhos  162<H>  06-27            finasteride 5 milliGRAM(s) Oral daily  aspirin enteric coated 81 milliGRAM(s) Oral daily  metoprolol 100 milliGRAM(s) Oral every 12 hours  ALBUTerol    90 MICROgram(s) HFA Inhaler 2 Puff(s) Inhalation every 6 hours PRN  pantoprazole   Suspension 40 milliGRAM(s) Oral before breakfast  heparin  Injectable 5000 Unit(s) SubCutaneous every 8 hours  piperacillin/tazobactam IVPB. 3.375 Gram(s) IV Intermittent every 8 hours  dexamethasone    Solution 2 milliGRAM(s) Oral every 12 hours  insulin glargine Injectable (LANTUS) 15 Unit(s) SubCutaneous at bedtime  insulin lispro (HumaLOG) corrective regimen sliding scale   SubCutaneous every 6 hours  dextrose 5%. 1000 milliLiter(s) IV Continuous <Continuous>  dextrose Gel 1 Dose(s) Oral once PRN  dextrose 50% Injectable 12.5 Gram(s) IV Push once  dextrose 50% Injectable 25 Gram(s) IV Push once  dextrose 50% Injectable 25 Gram(s) IV Push once  glucagon  Injectable 1 milliGRAM(s) IntraMuscular once PRN

## 2017-06-27 NOTE — PROGRESS NOTE ADULT - PROBLEM SELECTOR PROBLEM 8
Neuropathy in diabetes
DVT (deep venous thrombosis)

## 2017-06-27 NOTE — PROGRESS NOTE ADULT - PROBLEM SELECTOR PLAN 5
continue current managmnet and medications ,check hb a 1 c continue current managmnet and medications

## 2017-06-27 NOTE — PROGRESS NOTE ADULT - PROBLEM SELECTOR PLAN 3
seen by ENT  recommended trach which patient refuses   continue dexamethasone taper Palliative care consult requested ,to discuss advance directives and complete MOLST -DNR.DNI comfort care would be appropraite level of care at this point MRI brain to rule out mts process

## 2017-06-27 NOTE — PROGRESS NOTE ADULT - ASSESSMENT
56 yo AAM ,[facility] SNF resident with hx of DM,OBESITY,JYOTI,HTN,OM,DVT,R heel ulcer,PVD,R tonsillar ca ,brought to ER with fever 101.8 and sob ,he  was diagnosed with acute respiratory failure and found to have stridor and upper airway obstuction ,admitted to ICU ,placed on BIPAP and ENT eval requested 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 He did not undergo any surgery as he was a poor candidate. Thereafter, he was sent to a rehab facility where his status worsened overtime,he became LTC resident and was refusing tracheostomy which was offered few times  For the last few months, his mental and functional status has deteriorated He lost 150 lbs( his weight was 400 lbs when he was diagnosed with ca)  He has not been very responsive for about 10 days . He had a CT neck in May 2017 which showed severe airway obstruction. Patient was told he  needs a tracheostomy since he was diagnosed with SCC last August  He was started on augmentin recently by Dr Kay due to prorbale pneumonia and transferred to ER this morning due to fever and dyspnea Patient is not able to provide any hx in ER due to unresposive state and respiratory distress Admitted to ICU Palliative care consult requested ,to discuss advance directives and complete MOLST ,HCP requests everyhtning to be done ,but aware of patient's wishes not to have tracheostomy and in agreement with his decision Patient also found to be in ARF and to be hyperglycemic TRansferred to [facility]F after stabilisation of condition Neurology eval called due to lethargy and confusion Capacity eval requested ,will discuss CMO initiation with the wife if patient found not to have a capacity

## 2017-06-27 NOTE — PROGRESS NOTE ADULT - SUBJECTIVE AND OBJECTIVE BOX
57yMale admitted to med/surg with following history:  HPI:  56 yo AAM ,[facility] SN[facility] resident with hx of DM,OBESITY,JYOTI,HTN,OM,DVT,R heel ulcer,PVD,R tonsillar ca ,brought to ER with fever 101.8 and sob ,he  was diagnosed with acute respiratory failure and found to have stridor and upper airway obstuction ,admitted to ICU ,placed on BIPAP and ENT eval requested 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 He did not undergo any surgery as he was a poor candidate. Thereafter, he was sent to a rehab facility where his status worsened overtime,he became LTC resident and was refusing tracheostomy which was offered few times  For the last few months, his mental and functional status has deteriorated He lost 150 lbs( his weight was 400 lbs when he was diagnosed with ca)  He has not been very responsive for about 10 days . He had a CT neck in May 2017 which showed severe airway obstruction. Patient was told he  needs a tracheostomy since he was diagnosed with SCC last August  He was started on augmentin recently by Dr Kay due to prorbale pneumonia and transferred to ER this morning due to fever and dyspnea Patient is not able to provide any hx in ER due to unresposive state and respiratory distress Admitted to ICU Palliative care consult requested ,to discuss advance directives and complete MOLST ,HCP requests everyhtning to be done ,but aware of patient's wishes not to have tracheostomy and in agreement with his decision Patient also found to be in ARF and to be hyperglycemic (23 Jun 2017 07:52)      Psych HPI: Patient is currently suffering from carcinoma of the tonsil with poor prognosis, he is refusing tracheostomy. The question of decision making capacity was raised. Patient at this time is confused.    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  Osteomyelitis of ankle or foot  Diabetic retinopathy associated with type 2 diabetes mellitus: Insulin  Lumbago  Neuropathy in diabetes  Tobacco dependence  Cellulitis  JYOTI on CPAP  Hypertension  Obesity  DVT (deep venous thrombosis)  Diabetes mellitus  No significant past surgical history      Allergies    No Known Allergies    Intolerances      MEDICATIONS  (STANDING):  finasteride 5 milliGRAM(s) Oral daily  aspirin enteric coated 81 milliGRAM(s) Oral daily  metoprolol 100 milliGRAM(s) Oral every 12 hours  pantoprazole   Suspension 40 milliGRAM(s) Oral before breakfast  heparin  Injectable 5000 Unit(s) SubCutaneous every 8 hours  piperacillin/tazobactam IVPB. 3.375 Gram(s) IV Intermittent every 8 hours  dexamethasone    Solution 2 milliGRAM(s) Oral every 12 hours  insulin glargine Injectable (LANTUS) 15 Unit(s) SubCutaneous at bedtime  insulin lispro (HumaLOG) corrective regimen sliding scale   SubCutaneous every 6 hours  dextrose 5%. 1000 milliLiter(s) (50 mL/Hr) IV Continuous <Continuous>  dextrose 50% Injectable 12.5 Gram(s) IV Push once  dextrose 50% Injectable 25 Gram(s) IV Push once  dextrose 50% Injectable 25 Gram(s) IV Push once    MEDICATIONS  (PRN):  ALBUTerol    90 MICROgram(s) HFA Inhaler 2 Puff(s) Inhalation every 6 hours PRN Shortness of Breath and/or Wheezing  dextrose Gel 1 Dose(s) Oral once PRN Blood Glucose LESS THAN 70 milliGRAM(s)/deciliter  glucagon  Injectable 1 milliGRAM(s) IntraMuscular once PRN Glucose LESS THAN 70 milligrams/deciliter        ROS: Psych: See HPI.  All other systems negative.                          8.5    12.9  )-----------( 308      ( 27 Jun 2017 06:57 )             25.7   27 Jun 2017 06:57    149    |  113    |  61     ----------------------------<  192    4.1     |  29     |  2.30     Ca    8.8        27 Jun 2017 06:57  Phos  2.6       27 Jun 2017 06:57  Mg     2.9       27 Jun 2017 06:57    TPro  7.5    /  Alb  2.3    /  TBili  0.3    /  DBili  x      /  AST  16     /  ALT  29     /  AlkPhos  162    27 Jun 2017 06:57    TSH:     Utox:  Imaging:  Other Tests:    Old Records reviewed:    EXAM:  Vital Signs Last 24 Hrs  T(C): 36.8 (06-27-17 @ 04:50), Max: 36.9 (06-26-17 @ 22:02)  T(F): 98.2 (06-27-17 @ 04:50), Max: 98.4 (06-26-17 @ 22:02)  HR: 81 (06-27-17 @ 04:50) (67 - 81)  BP: 169/87 (06-27-17 @ 04:50) (135/80 - 169/87)  BP(mean): --  RR: 18 (06-27-17 @ 04:50) (16 - 19)  SpO2: 99% (06-27-17 @ 04:50) (99% - 100%)  Gen Appearance: Well groomed  Gait/Station/Muscle Tone: Unable to assess   Abnl Movements: Absent  Speech: Delayed  TP; Unable to assess   Associations: Unable to assess   TC: Unable to assess   Mood: Fair  Affect: Congruent  Consciousness/orientation: Impaired  Memory:   Recent:  Impaired  Remote: Impaired  Attention/Concentration: Impaired  Language: WNL  Fund of Knowledge: Impaired  Insight: Poor  Judgment: poor    Observation Status: Regular  Follow-up: As needed

## 2017-06-27 NOTE — PROGRESS NOTE ADULT - PROBLEM SELECTOR PLAN 1
BIPAP ,ABG monitoring ,pulm followup  Capacity eval requested ,will discuss CMO initiation with the wife if patient found not to have a capacity Patient requires tracheostomy ,but he refuses it since august 2016   pt is DNR Patient requires tracheostomy ,but he refuses it since august 2016   pt is DNR Wife states that she respects his wishes ,because he initially refused it when was awake and alert .She is in agreement with limited medical intervention and comfort care ,she understands poor prognosis

## 2017-06-27 NOTE — PROGRESS NOTE ADULT - PROBLEM SELECTOR PLAN 2
Admitted for septic workup and evaluation,send blood and urine cx,serial lactate levels,monitor vitals closley,ivfs hydration,monitor urine output and renal profile,iv abx initiated Diagnosed with pneumonia secondary to aspiration Aspiration precautions administarted and being treated with zosyn ad per ID recommendation

## 2017-06-28 VITALS — HEART RATE: 82 BPM | OXYGEN SATURATION: 100 % | SYSTOLIC BLOOD PRESSURE: 157 MMHG | DIASTOLIC BLOOD PRESSURE: 92 MMHG

## 2017-06-28 LAB
ANION GAP SERPL CALC-SCNC: 4 MMOL/L — LOW (ref 5–17)
BASE EXCESS BLDA CALC-SCNC: 3.2 MMOL/L — HIGH (ref -2–2)
BUN SERPL-MCNC: 53 MG/DL — HIGH (ref 7–23)
CALCIUM SERPL-MCNC: 8.7 MG/DL — SIGNIFICANT CHANGE UP (ref 8.5–10.1)
CHLORIDE SERPL-SCNC: 116 MMOL/L — HIGH (ref 96–108)
CO2 SERPL-SCNC: 31 MMOL/L — SIGNIFICANT CHANGE UP (ref 22–31)
CREAT SERPL-MCNC: 2.2 MG/DL — HIGH (ref 0.5–1.3)
CULTURE RESULTS: SIGNIFICANT CHANGE UP
CULTURE RESULTS: SIGNIFICANT CHANGE UP
GLUCOSE SERPL-MCNC: 190 MG/DL — HIGH (ref 70–99)
HCO3 BLDA-SCNC: 27 MMOL/L — SIGNIFICANT CHANGE UP (ref 23–27)
HCT VFR BLD CALC: 24.9 % — LOW (ref 39–50)
HGB BLD-MCNC: 8.2 G/DL — LOW (ref 13–17)
HOROWITZ INDEX BLDA+IHG-RTO: 100 — SIGNIFICANT CHANGE UP
MCHC RBC-ENTMCNC: 32.9 GM/DL — SIGNIFICANT CHANGE UP (ref 32–36)
MCHC RBC-ENTMCNC: 35.6 PG — HIGH (ref 27–34)
MCV RBC AUTO: 108.1 FL — HIGH (ref 80–100)
PCO2 BLDA: 54 MMHG — HIGH (ref 32–46)
PH BLDA: 7.34 — LOW (ref 7.35–7.45)
PLATELET # BLD AUTO: 338 K/UL — SIGNIFICANT CHANGE UP (ref 150–400)
PO2 BLDA: 92 MMHG — SIGNIFICANT CHANGE UP (ref 74–108)
POTASSIUM SERPL-MCNC: 4.2 MMOL/L — SIGNIFICANT CHANGE UP (ref 3.5–5.3)
POTASSIUM SERPL-SCNC: 4.2 MMOL/L — SIGNIFICANT CHANGE UP (ref 3.5–5.3)
RBC # BLD: 2.3 M/UL — LOW (ref 4.2–5.8)
RBC # FLD: 12.7 % — SIGNIFICANT CHANGE UP (ref 10.3–14.5)
SAO2 % BLDA: 98 % — HIGH (ref 92–96)
SODIUM SERPL-SCNC: 151 MMOL/L — HIGH (ref 135–145)
SPECIMEN SOURCE: SIGNIFICANT CHANGE UP
SPECIMEN SOURCE: SIGNIFICANT CHANGE UP
WBC # BLD: 14.1 K/UL — HIGH (ref 3.8–10.5)
WBC # FLD AUTO: 14.1 K/UL — HIGH (ref 3.8–10.5)

## 2017-06-28 RX ORDER — DEXAMETHASONE 0.5 MG/5ML
0 ELIXIR ORAL
Qty: 0 | Refills: 0 | COMMUNITY
Start: 2017-06-28

## 2017-06-28 RX ORDER — SOD,AMMONIUM,POTASSIUM LACTATE
1 CREAM (GRAM) TOPICAL
Qty: 0 | Refills: 0 | Status: DISCONTINUED | OUTPATIENT
Start: 2017-06-28 | End: 2017-06-28

## 2017-06-28 RX ORDER — SOD,AMMONIUM,POTASSIUM LACTATE
1 CREAM (GRAM) TOPICAL
Qty: 0 | Refills: 0 | COMMUNITY
Start: 2017-06-28

## 2017-06-28 RX ORDER — DEXAMETHASONE 0.5 MG/5ML
2 ELIXIR ORAL
Qty: 0 | Refills: 0 | COMMUNITY
Start: 2017-06-28

## 2017-06-28 RX ADMIN — Medication 2 MILLIGRAM(S): at 17:03

## 2017-06-28 RX ADMIN — Medication 1 APPLICATION(S): at 17:02

## 2017-06-28 RX ADMIN — Medication 81 MILLIGRAM(S): at 12:28

## 2017-06-28 RX ADMIN — Medication 3: at 00:13

## 2017-06-28 RX ADMIN — Medication 2: at 12:28

## 2017-06-28 RX ADMIN — Medication 100 MILLIGRAM(S): at 05:39

## 2017-06-28 RX ADMIN — Medication 2 MILLIGRAM(S): at 05:39

## 2017-06-28 RX ADMIN — Medication 100 MILLIGRAM(S): at 17:02

## 2017-06-28 RX ADMIN — PANTOPRAZOLE SODIUM 40 MILLIGRAM(S): 20 TABLET, DELAYED RELEASE ORAL at 05:39

## 2017-06-28 RX ADMIN — PIPERACILLIN AND TAZOBACTAM 25 GRAM(S): 4; .5 INJECTION, POWDER, LYOPHILIZED, FOR SOLUTION INTRAVENOUS at 13:55

## 2017-06-28 RX ADMIN — FINASTERIDE 5 MILLIGRAM(S): 5 TABLET, FILM COATED ORAL at 12:28

## 2017-06-28 RX ADMIN — Medication 1: at 05:38

## 2017-06-28 RX ADMIN — Medication 2: at 18:15

## 2017-06-28 RX ADMIN — PIPERACILLIN AND TAZOBACTAM 25 GRAM(S): 4; .5 INJECTION, POWDER, LYOPHILIZED, FOR SOLUTION INTRAVENOUS at 05:38

## 2017-06-28 RX ADMIN — HEPARIN SODIUM 5000 UNIT(S): 5000 INJECTION INTRAVENOUS; SUBCUTANEOUS at 05:39

## 2017-06-28 RX ADMIN — HEPARIN SODIUM 5000 UNIT(S): 5000 INJECTION INTRAVENOUS; SUBCUTANEOUS at 13:55

## 2017-06-28 NOTE — PROGRESS NOTE ADULT - PROBLEM SELECTOR PROBLEM 2
Hypertension
Pneumonia due to infectious organism, unspecified laterality, unspecified part of lung
Pneumonia due to infectious organism, unspecified laterality, unspecified part of lung
Acute respiratory failure
Hypertension
Pneumonia due to infectious organism, unspecified laterality, unspecified part of lung
Pneumonia due to infectious organism, unspecified laterality, unspecified part of lung
Acute respiratory failure
Squamous cell carcinoma of tonsil

## 2017-06-28 NOTE — PROGRESS NOTE ADULT - SUBJECTIVE AND OBJECTIVE BOX
Patient is a 57y Male with past medical history of           presenting with        who reports no complaints overnight.    REVIEW OF SYSTEMS:    CONSTITUTIONAL: unable to obtained   Allergies    No Known Allergies    Intolerances        MEDICATIONS  (STANDING):  finasteride 5 milliGRAM(s) Oral daily  aspirin enteric coated 81 milliGRAM(s) Oral daily  metoprolol 100 milliGRAM(s) Oral every 12 hours  pantoprazole   Suspension 40 milliGRAM(s) Oral before breakfast  heparin  Injectable 5000 Unit(s) SubCutaneous every 8 hours  piperacillin/tazobactam IVPB. 3.375 Gram(s) IV Intermittent every 8 hours  dexamethasone    Solution 2 milliGRAM(s) Oral every 12 hours  insulin glargine Injectable (LANTUS) 15 Unit(s) SubCutaneous at bedtime  insulin lispro (HumaLOG) corrective regimen sliding scale   SubCutaneous every 6 hours  dextrose 5%. 1000 milliLiter(s) (50 mL/Hr) IV Continuous <Continuous>  dextrose 50% Injectable 12.5 Gram(s) IV Push once  dextrose 50% Injectable 25 Gram(s) IV Push once  dextrose 50% Injectable 25 Gram(s) IV Push once  ammonium lactate 12% Lotion 1 Application(s) Topical two times a day      Vitals:  T(F): 98.7 (06-28-17 @ 14:00), Max: 98.8 (06-28-17 @ 04:38)  HR: 82 (06-28-17 @ 16:57)  BP: 157/92 (06-28-17 @ 16:57)  BP(mean): --  RR: 16 (06-28-17 @ 14:00)  SpO2: 100% (06-28-17 @ 16:57)  Wt(kg): --    I and O's:    06-27 @ 07:01  -  06-28 @ 07:00  --------------------------------------------------------  IN: 965 mL / OUT: 15 mL / NET: 950 mL    06-28 @ 07:01  -  06-28 @ 17:47  --------------------------------------------------------  IN: 620 mL / OUT: 0 mL / NET: 620 mL            PHYSICAL EXAM:    Constitutional: NAD,   Neck: No JVD  Respiratory: decrease bs b/l   Cardiovascular: S1 and S2  Gastrointestinal: BS+, soft, NT/ND  Extremities: No peripheral edema    LABS:                        8.2    14.1  )-----------( 338      ( 28 Jun 2017 08:36 )             24.9                         8.5    12.9  )-----------( 308      ( 27 Jun 2017 06:57 )             25.7       151    |  116    |  53     ----------------------------<  190       28 Jun 2017 08:36  4.2     |  31     |  2.20     149    |  113    |  61     ----------------------------<  192       27 Jun 2017 06:57  4.1     |  29     |  2.30     146    |  110    |  85     ----------------------------<  166       25 Jun 2017 05:48  4.3     |  31     |  2.70     Ca    8.7        28 Jun 2017 08:36  Ca    8.8        27 Jun 2017 06:57    Phos  2.6       27 Jun 2017 06:57  Phos  2.8       25 Jun 2017 05:48    Mg     2.9       27 Jun 2017 06:57  Mg     2.9       25 Jun 2017 05:48    TPro  7.5    /  Alb  2.3    /  TBili  0.3    /        27 Jun 2017 06:57  DBili  x      /  AST  16     /  ALT  29     /  AlkPhos  162      TPro  7.3    /  Alb  2.2    /  TBili  0.2    /        25 Jun 2017 05:48  DBili  x      /  AST  31     /  ALT  36     /  AlkPhos  160          Serum Osmo:   Serum Uric Acid:   MATTHEW:   Double Stranded DNA:   C3:   C3 Nephritic Factor:   C4:   p-ANCA:   c-ANCA:   Anti-GBM:   WANDA-Smith Antibody:   Immunofixation:   [place]/Lambda Free Light Chain:   SPEP:   Hepatitis Panel:   HIV-1/2:     Urine Studies:      Urine chemistry:   Urine Na: Sodium, Random Urine: <20 mmol/L (06-23 @ 20:29)    Urine Creatinine: Creatinine, Random Urine: 84 mg/dL (06-23 @ 20:29)    Urine Protein/Cr ratio:  Urine K:   Urine Osm:   24 Hr urine studies:     24 hr urine Creatinine Clearance:    RADIOLOGY & ADDITIONAL STUDIES:

## 2017-06-28 NOTE — PROGRESS NOTE ADULT - SUBJECTIVE AND OBJECTIVE BOX
CHIEF COMPLAINT:    SUBJECTIVE: Pt seen and evaluated for Right heel ulcer.    Pt denies F/C/N/V at this time. Pt denies any other pedal complaints.      Vital Signs Last 24 Hrs  T(C): 37.1 (28 Jun 2017 04:38), Max: 37.1 (28 Jun 2017 04:38)  T(F): 98.8 (28 Jun 2017 04:38), Max: 98.8 (28 Jun 2017 04:38)  HR: 79 (28 Jun 2017 04:38) (73 - 79)  BP: 179/76 (28 Jun 2017 04:38) (122/76 - 179/76)  BP(mean): --  RR: 18 (28 Jun 2017 04:38) (17 - 18)  SpO2: 100% (28 Jun 2017 04:38) (99% - 100%)    OBJECTIVE:   PHYSICAL EXAM:  Focused Lower Extremity Physical Examination:   Vasc: Pedal pulses PT/DP non palpable b/l. Skin temperature is cool to cool from proximal to distal.  Derm: nonblanchable erythema noted to the right heel. No open wounds no drainage.   Neuro: deferred  Musc:  deferred    LABS:                          8.2    14.1  )-----------( 338      ( 28 Jun 2017 08:36 )             24.9       06-28    151<H>  |  116<H>  |  53<H>  ----------------------------<  190<H>  4.2   |  31  |  2.20<H>    Ca    8.7      28 Jun 2017 08:36  Phos  2.6     06-27  Mg     2.9     06-27    TPro  7.5  /  Alb  2.3<L>  /  TBili  0.3  /  DBili  x   /  AST  16  /  ALT  29  /  AlkPhos  162<H>  06-27            finasteride 5 milliGRAM(s) Oral daily  aspirin enteric coated 81 milliGRAM(s) Oral daily  metoprolol 100 milliGRAM(s) Oral every 12 hours  ALBUTerol    90 MICROgram(s) HFA Inhaler 2 Puff(s) Inhalation every 6 hours PRN  pantoprazole   Suspension 40 milliGRAM(s) Oral before breakfast  heparin  Injectable 5000 Unit(s) SubCutaneous every 8 hours  piperacillin/tazobactam IVPB. 3.375 Gram(s) IV Intermittent every 8 hours  dexamethasone    Solution 2 milliGRAM(s) Oral every 12 hours  insulin glargine Injectable (LANTUS) 15 Unit(s) SubCutaneous at bedtime  insulin lispro (HumaLOG) corrective regimen sliding scale   SubCutaneous every 6 hours  dextrose 5%. 1000 milliLiter(s) IV Continuous <Continuous>  dextrose Gel 1 Dose(s) Oral once PRN  dextrose 50% Injectable 12.5 Gram(s) IV Push once  dextrose 50% Injectable 25 Gram(s) IV Push once  dextrose 50% Injectable 25 Gram(s) IV Push once  glucagon  Injectable 1 milliGRAM(s) IntraMuscular once PRN  ammonium lactate 12% Lotion 1 Application(s) Topical two times a day

## 2017-06-28 NOTE — DISCHARGE NOTE ADULT - CARE PROVIDER_API CALL
Amrik Kay), [place] [address]  Phone: [phone]  Fax: [phone] Amrik Kay), [address] [address]  Phone: [phone]  Fax: [phone]    Pahlavan, Mohsen (MD), Nephrology  [address] [place] [address]  Phone: [phone]  Fax: [phone]

## 2017-06-28 NOTE — PROGRESS NOTE ADULT - SUBJECTIVE AND OBJECTIVE BOX
Patient was seen and examined on a day of discharge . Plan of care , discharge medications and recommendations discussed with consultants and clearance for discharge obtained .Spoke to Dr Rosalva shahid to d/c to LTC ,free water flushes added ,will follow bmp and na level in [facility] LTC D/c meds d/w Dr Hanley and Dr Selma madsen to discharge on 5 days of po augmentin and decadrone  Social service , case managment  and medical staff are aware of plan. Wife is notified. Discharge summary  is  prepared electronically Patient was seen and examined on a day of discharge . Plan of care , discharge medications and recommendations discussed with consultants and clearance for discharge obtained .Spoke to Dr Rosalva shahid to d/c to LTC ,free water flushes added ,will follow bmp and na level in [facility] D/c meds d/w Dr Hanley and Dr Selma madsen to discharge on 5 days of po augmentin and decadrone  Social service , case managment  and medical staff are aware of plan. Wife is notified. Discharge summary  is  prepared electronically, patient is being discharged with palliative managmnet and  limited medical interventions  as per wife request MOLST is completed by palliative care service DNR/ DNI ,no tracheostomy ,no invasive testing or treatment ,no aggressive managmnet Symptomatic management only Patient and wife would like him to return to LTC as soon as possible Aware of poor prognosis Discussed with PMD from SNF Dr Alarcon

## 2017-06-28 NOTE — DISCHARGE NOTE ADULT - PATIENT PORTAL LINK FT
“You can access the [employer] Patient Portal, offered by [facility], by registering with the following website: [email or web address]”

## 2017-06-28 NOTE — PROGRESS NOTE ADULT - PROBLEM SELECTOR PROBLEM 4
Pneumonia due to infectious organism, unspecified laterality, unspecified part of lung
Metabolic encephalopathy
Metabolic encephalopathy
Hypertension
Metabolic encephalopathy
Metabolic encephalopathy
Pneumonia due to infectious organism, unspecified laterality, unspecified part of lung
Encephalopathy, unspecified
Hypertension

## 2017-06-28 NOTE — PROGRESS NOTE ADULT - SUBJECTIVE AND OBJECTIVE BOX
Patient seen and examined today Plan discussed/Questions answered  (with patient/ancillary staff/colleagues) Chart notes reviewd More detailed Pulm/Critical Care notes, assessment and plan  will be added later today as needed after reviewing further labs as they become available     Notable interval events reviewed  Right Stage 1 heel ulcer  Cont Offloading to the Right heel  Cont palliative local wound care    ROS/PE  . REVIEW OF SYMPTOMS    Able to give ROS  Yes     RELIABLE NO   Weakness Yes   Chills No   Vision changes No  Lymph nodes No enlarged glands   Endocrine No unexplained hair loss No het or cold intolerance   Allergy No hives   Sore throat No   Coughing blood No  Headache No  Confusion YES  Chest pain No  Palpitations No   Pain abdomen NO   Shortness of breath YES  Vomiting NO  Pain neck No  Pain abdomen NO     PHYSICAL EXAM    HEENT Unremarkable PERRLA atraumatic  RESP Fair air entry EXP prolonged    Harsh breath sound Resp distres mild  CARDIAC S1 S2 No S3     NO JVD   Awake Alert and ORIENTED x on  ABDOMEN SOFT BS PRESENT NOT DISTENDED No hepatosplenomegaly  PEDAL EDEMA present No calf tenderness  NO rash GENERAL Not TOXIC looking  . Patient seen and examined today Plan discussed/Questions answered  (with patient/ancillary staff/colleagues) Chart notes reviewd More detailed Pulm/Critical Care notes, assessment and plan  will be added later today as needed after reviewing further labs as they become available     Notable interval events reviewed  Right Stage 1 heel ulcer  Cont Offloading to the Right heel  Cont palliative local wound care    ROS/PE  . REVIEW OF SYMPTOMS    Able to give ROS  Yes     RELIABLE NO   Weakness Yes   Chills No   Vision changes No  Lymph nodes No enlarged glands   Endocrine No unexplained hair loss No het or cold intolerance   Allergy No hives   Sore throat No   Coughing blood No  Headache No  Confusion YES  Chest pain No  Palpitations No   Pain abdomen NO   Shortness of breath YES  Vomiting NO  Pain neck No  Pain abdomen NO     PHYSICAL EXAM    HEENT Unremarkable PERRLA atraumatic  RESP Fair air entry EXP prolonged    Harsh breath sound Resp distres mild  CARDIAC S1 S2 No S3     NO JVD   Awake Alert and ORIENTED x on  ABDOMEN SOFT BS PRESENT NOT DISTENDED No hepatosplenomegaly  PEDAL EDEMA present No calf tenderness  NO rash GENERAL Not TOXIC looking  . 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  JYOTI (on CPAP  14), PVD smoker who presented from [facility] with resp distress and fever T 101 In ER noted to have insp stridor ABG on 100% (9a) 734/54/92 and was admitted to [facility] P ICU 6/23/2017 6/25 I dw pt's spouse and she told me pt absolutely declines trach even if it is lifesaving situation Pt is dnr and prognosis is poor He was moved to reg floor 6/25 and plan is for MR brain to look for mets He got noct BPAP 16/8/.4 for JYOTI and for resp failure and was Rxed with zosyn (6/24) for asp pneum and decadron taper for uao and was given Glucerna PEG feeds CMO being considered May change to augmentin and taper decadron over 5 d   VITALS/LABS  6/28/2017 afeb 79 170/70 18 100%   6/28/2017 W 14.1 Hb 8.2 Plt 338 Na 151 K 4.2 CO2 31 Cr 2.2   PROBLEM ASSESSMENT PLAN   UAO  6/23 Seen by ENT HO SCC R tonsil sp RT (not told well) sp Chemo last 10/2016) Need trach which he has declined   On  Decadron 2.2 (6/25)   RESP   6/24 6a BPAP 16/8/14/.4 736/52/142   Monitor PO ABG Target PO 90-96%   ASPIRATION PNEUMONIA   6/23-6/24-6/25-6/28/2017 W 24.5-17.2-16.1-14.1   6/24-6/27 pct 1.56-.34 6/23 bc n 6/23 uc n   6/27 CXR NAPD   6/23 LA 1.6-1.1  6/22/2017 CXR lll infiltr  zosyn (6/23)   COPD   albuterol hfa.4p (6/23)   CAD  ASA 81 (6/23) metoprolol 100.2 (6/23)   CARMEL   8/9/2016-6/23-6/24-6/25-6/27/2017 Cr 3.5-3.9-3.1-2.7-2.3     6/23 Susan Below 20   HYPERNATREMIA   6/28 Na 151  .6 gt (6/28)   ANEMIA LIKELY SEC CKD   8/9/2016-6/23-6/24-6/25-6/27-6/28/2017  Hb 8.1-8.5-7.3-7.9-8.5-8.2  OBTUNDATION   MR did not reveal any lesions   GLOBAL ISSUE/BEST PRACTICE:        PROBLEM:      Analgesia:     na                        PROBLEM: Sedation:     na               PROBLEM: HOB elevation:   yes             PROBLEM: Stress ulcer proph:    protonix 40 (6/24)                       PROBLEM: VTE prophylaxis:      hsc (6/23)                        PROBLEM: Glycemic control:    iss (6/23)             PROBLEM: Nutrition:    Glucerna 1.2 1680 (6/24)                       PROBLEM: Advanced directive: dnr     PROBLEM: Allergies:  na  TIME SPENT Over 25 minutes aggregate time spent on encounter; activities included   direct patient care, counseling and/or coordinating care reviewing notes, lab data/ imaging , discussion with multidisciplinary team/ patient  /family. Risks, benefits, alternatives  discussed in detail. Questions/concerns  were addressed  to the best of my ability .

## 2017-06-28 NOTE — DISCHARGE NOTE ADULT - HOSPITAL COURSE
58 yo AAM ,[facility] SNF resident with hx of DM,OBESITY,JYOTI,HTN,OM,DVT,R heel ulcer,PVD,R tonsillar ca ,brought to ER with fever 101.8 and sob ,he  was diagnosed with acute respiratory failure and found to have stridor and upper airway obstuction ,admitted to ICU ,placed on BIPAP and ENT eval requested 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 He did not undergo any surgery as he was a poor candidate. Thereafter, he was sent to a rehab facility where his status worsened overtime,he became LTC resident and was refusing tracheostomy which was offered few times  For the last few months, his mental and functional status has deteriorated He lost 150 lbs( his weight was 400 lbs when he was diagnosed with ca)  He has not been very responsive for about 10 days . He had a CT neck in May 2017 which showed severe airway obstruction. Patient was told he  needs a tracheostomy since he was diagnosed with SCC last August  He was started on augmentin recently by Dr Kay due to prorbale pneumonia and transferred to ER this morning due to fever and dyspnea Patient is not able to provide any hx in ER due to unresposive state and respiratory distress Admitted to ICU Palliative care consult requested ,to discuss advance directives and complete MOLST ,HCP requests everyhtning to be done ,but aware of patient's wishes not to have tracheostomy and in agreement with his decision Patient also found to be in ARF and to be hyperglycemic Transferred to [facility] after stabilisation of condition Neurology eval called due to lethargy and confusion Capacity eval requested ,will discuss CMO initiation with the wife if patient found not to have a capacity Wife Erika agreed with palliative measures only and requested limited medical interventions ,no agressive or invasive tx to be performed -MOLST is completed by palliative care service 56 yo AAM ,[facility] SNF resident with hx of DM,OBESITY,JYOTI,HTN,OM,DVT,R heel ulcer,PVD,R tonsillar ca ,brought to ER with fever 101.8 and sob ,he  was diagnosed with acute respiratory failure and found to have stridor and upper airway obstuction ,admitted to ICU ,placed on BIPAP and ENT eval requested 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 He did not undergo any surgery as he was a poor candidate. Thereafter, he was sent to a rehab facility where his status worsened overtime,he became LTC resident and was refusing tracheostomy which was offered few times  For the last few months, his mental and functional status has deteriorated He lost 150 lbs( his weight was 400 lbs when he was diagnosed with ca)  He has not been very responsive for about 10 days . He had a CT neck in May 2017 which showed severe airway obstruction. Patient was told he  needs a tracheostomy since he was diagnosed with SCC last August  He was started on augmentin recently by Dr Kay due to prorbale pneumonia and transferred to ER this morning due to fever and dyspnea Patient is not able to provide any hx in ER due to unresposive state and respiratory distress Admitted to ICU Palliative care consult requested ,to discuss advance directives and complete MOLST ,HCP aware of patient's wishes not to have tracheostomy and in agreement with his decision,she states he was able to express his wishes very clearly in a past . Patient also found to be in ARF and to be hyperglycemic Transferred to F after stabilisation of condition Neurology eval called due to lethargy and confusion ,disgnosed with metabolic encephalopathy ,multifactorial secondary to renal failure with hypernatremia ,respiratory failure and pneumonia Capacity eval requested , patient found not to have a capacity Wife Erika agreed with palliative measures only and requested limited medical interventions ,no agressive or invasive tx to be performed -MOLST is completed by palliative care service

## 2017-06-28 NOTE — PROGRESS NOTE ADULT - PROBLEM SELECTOR PROBLEM 3
Diabetes mellitus
Tonsillar neoplasm
Tonsillar neoplasm
Diabetes mellitus
Tonsillar neoplasm
Tonsillar neoplasm
Type 2 diabetes mellitus with other neurologic complication, without long-term current use of insulin
Sepsis
Type 2 diabetes mellitus with other neurologic complication, without long-term current use of insulin

## 2017-06-28 NOTE — PROGRESS NOTE ADULT - ASSESSMENT
Right Stage 1 heel ulcer      Cont Offloading to the Right heel  Cont palliative local wound care    Pt to follow up in C upon discharge

## 2017-06-28 NOTE — PROGRESS NOTE ADULT - SUBJECTIVE AND OBJECTIVE BOX
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)  Respiratory failure (J96.90)  Renal failure (N19)  JYOTI on CPAP (G47.33)  Heel ulcer due to DM (E11.621)  Hypertension (I10)  Diabetes mellitus (E11.9)  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 He did not undergo any surgery as he was a poor candidate. Thereafter, he was sent to a rehab facility where his status worsened overtime,he became LTC resident and was refusing tracheostomy which was offered few times  For the last few months, his mental and functional status has deteriorated He lost 150 lbs( his weight was 400 lbs when he was diagnosed with ca)  He has not been very responsive for about 10 days . He had a CT neck in May 2017 which showed severe airway obstruction. Patient was told he  needs a tracheostomy since he was diagnosed with SCC last August  He was started on augmentin recently by Dr Kay due to prorbale pneumonia and transferred to ER this morning due to fever and dyspnea Patient is not able to provide any hx in ER due to unresposive state and respiratory distress Admitted to ICU Palliative care consult requested ,to discuss advance directives and complete MOLST ,HCP requests everyhtning to be done ,but aware of patient's wishes not to have tracheostomy and in agreement with his decision Patient also found to be in ARF and to be hyperglycemic (23 Jun 2017 07:52)      REVIEW OF SYSTEMS:    CONSTITUTIONAL: No fever, weight loss, or fatigue  EYES: No eye pain, visual disturbances, or discharge  ENMT:  No difficulty hearing, tinnitus, vertigo; No sinus or throat pain  NECK: No pain or stiffness  BREASTS: No pain, masses, or nipple discharge  RESPIRATORY: No cough, wheezing, chills or hemoptysis; No shortness of breath  CARDIOVASCULAR: No chest pain, palpitations, dizziness, or leg swelling  GASTROINTESTINAL: No abdominal or epigastric pain. No nausea, vomiting, or hematemesis; No diarrhea or constipation. No melena or hematochezia.  GENITOURINARY: No dysuria, frequency, hematuria, or incontinence  NEUROLOGICAL: No headaches, memory loss, loss of strength, numbness, or tremors  SKIN: No itching, burning, rashes, or lesions   LYMPH NODES: No enlarged glands  ENDOCRINE: No heat or cold intolerance; No hair loss  MUSCULOSKELETAL: No joint pain or swelling; No muscle, back, or extremity pain  PSYCHIATRIC: No depression, anxiety, mood swings, or difficulty sleeping  HEME/LYMPH: No easy bruising, or bleeding gums  ALLERGY AND IMMUNOLOGIC: No hives or eczema    MEDICATIONS  (STANDING):  finasteride 5 milliGRAM(s) Oral daily  aspirin enteric coated 81 milliGRAM(s) Oral daily  metoprolol 100 milliGRAM(s) Oral every 12 hours  pantoprazole   Suspension 40 milliGRAM(s) Oral before breakfast  heparin  Injectable 5000 Unit(s) SubCutaneous every 8 hours  piperacillin/tazobactam IVPB. 3.375 Gram(s) IV Intermittent every 8 hours  dexamethasone    Solution 2 milliGRAM(s) Oral every 12 hours  insulin glargine Injectable (LANTUS) 15 Unit(s) SubCutaneous at bedtime  insulin lispro (HumaLOG) corrective regimen sliding scale   SubCutaneous every 6 hours  dextrose 5%. 1000 milliLiter(s) (50 mL/Hr) IV Continuous <Continuous>  dextrose 50% Injectable 12.5 Gram(s) IV Push once  dextrose 50% Injectable 25 Gram(s) IV Push once  dextrose 50% Injectable 25 Gram(s) IV Push once  ammonium lactate 12% Lotion 1 Application(s) Topical two times a day    MEDICATIONS  (PRN):  ALBUTerol    90 MICROgram(s) HFA Inhaler 2 Puff(s) Inhalation every 6 hours PRN Shortness of Breath and/or Wheezing  dextrose Gel 1 Dose(s) Oral once PRN Blood Glucose LESS THAN 70 milliGRAM(s)/deciliter  glucagon  Injectable 1 milliGRAM(s) IntraMuscular once PRN Glucose LESS THAN 70 milligrams/deciliter      ALLERGIES: No Known Allergies      FAMILY HISTORY:  Family history of embolic stroke (Uncle)  Family history of acute myocardial infarction (Uncle)  Family history of hypertension (Grandparent, Uncle)  Family history of diabetes mellitus (DM)      PHYSICAL EXAMINATION:  -----------------------------  T(C): 37.1 (06-28-17 @ 04:38), Max: 37.1 (06-28-17 @ 04:38)  HR: 79 (06-28-17 @ 04:38) (73 - 79)  BP: 179/76 (06-28-17 @ 04:38) (122/76 - 179/76)  RR: 18 (06-28-17 @ 04:38) (17 - 18)  SpO2: 100% (06-28-17 @ 04:38) (99% - 100%)  Wt(kg): --    06-27 @ 07:01  -  06-28 @ 07:00  --------------------------------------------------------  IN:    Glucerna: 765 mL    Solution: 200 mL  Total IN: 965 mL    OUT:    Nasoenteral Tube: 15 mL  Total OUT: 15 mL    Total NET: 950 mL            Constitutional: well developed, normal appearance, well groomed, well nourished, no deformities and no acute distress.   Eyes: the conjunctiva exhibited no abnormalities and the eyelids demonstrated no xanthelasmas.   HEENT: normal oral mucosa, no oral pallor and no oral cyanosis.   Neck: normal jugular venous A waves present, normal jugular venous V waves present and no jugular venous marrufo A waves.   Pulmonary: no respiratory distress, normal respiratory rhythm and effort, no accessory muscle use and lungs were clear to auscultation bilaterally.   Cardiovascular: heart rate and rhythm were normal, normal S1 and S2 and no murmur, gallop, rub, heave or thrill are present.   Abdomen: soft, non-tender, no hepato-splenomegaly and no abdominal mass palpated.   Musculoskeletal: the gait could not be assessed..   Extremities: no clubbing of the fingernails, no localized cyanosis, no petechial hemorrhages and no ischemic changes.   Skin: normal skin color and pigmentation, no rash, no venous stasis, no skin lesions, no skin ulcer and no xanthoma was observed.   Psychiatric: oriented to person, place, and time, the affect was normal, the mood was normal and not feeling anxious.     LABS:   --------  06-27    149<H>  |  113<H>  |  61<H>  ----------------------------<  192<H>  4.1   |  29  |  2.30<H>    Ca    8.8      27 Jun 2017 06:57  Phos  2.6     06-27  Mg     2.9     06-27    TPro  7.5  /  Alb  2.3<L>  /  TBili  0.3  /  DBili  x   /  AST  16  /  ALT  29  /  AlkPhos  162<H>  06-27                         8.5    12.9  )-----------( 308      ( 27 Jun 2017 06:57 )             25.7     PT/INR - ( 27 Jun 2017 06:57 )   PT: 12.4 sec;   INR: 1.13 ratio                     RADIOLOGY:  -----------------        ECG:

## 2017-06-28 NOTE — DISCHARGE NOTE ADULT - MEDICATION SUMMARY - MEDICATIONS TO TAKE
I will START or STAY ON the medications listed below when I get home from the hospital:    finasteride 5 mg oral tablet  -- 1 tab(s) by mouth once a day  -- Indication: For bph    dexamethasone 0.5 mg/5 mL oral liquid  -- 2 milligram(s) by gastrostomy tube once a day x 5 days   -- Indication: For upper airway obstruction     acetaminophen 325 mg oral tablet  -- 2 tab(s) by mouth every 6 hours, As needed, For Temp greater than 38 C (100.4 F)  -- Indication: For fever    aspirin 81 mg oral delayed release tablet  -- 1 tab(s) by mouth once a day  -- Indication: For cad    heparin  -- 5000 unit(s) subcutaneous every 8 hours  -- Indication: For DVT prophylaxis    gabapentin 400 mg oral capsule  -- 2 cap(s) by mouth 2 times a day  -- Indication: For Neuropathy in diabetes    insulin lispro 100 units/mL subcutaneous solution  -- 5 unit(s) subcutaneous 3 times a day (before meals)  -- Indication: For Diabetes mellitus    insulin lispro 100 units/mL subcutaneous solution  --  subcutaneous 3 times a day (before meals); 2 Unit(s) if Glucose 151 - 200  4 Unit(s) if Glucose 201 - 250  6 Unit(s) if Glucose 251 - 300  8 Unit(s) if Glucose 301 - 350  10 Unit(s) if Glucose 351 - 400  12 Unit(s) if Glucose Greater Than 400  -- Indication: For Diabetes mellitus    insulin glargine  -- 15 unit(s) subcutaneous once a day (at bedtime)  -- Indication: For Diabetes mellitus    metoprolol tartrate 100 mg oral tablet  -- 1 tab(s) by mouth every 12 hours  -- Indication: For Htn    albuterol CFC free 90 mcg/inh inhalation aerosol  -- 2 puff(s) inhaled every 6 hours, As needed, Shortness of Breath and/or Wheezing  -- Indication: For copd    ammonium lactate 12% topical lotion  -- 1 application on skin 2 times a day  -- Indication: For Shin rash    torsemide 20 mg oral tablet  -- 1 tab(s) by gastrostomy tube every other day,hold for sbp below 100  -- Indication: For chf    ferrous sulfate 325 mg (65 mg elemental iron) oral delayed release tablet  -- 1 tab(s) by mouth once a day  -- Indication: For Anemia    zinc sulfate 220 mg oral capsule  -- 1 cap(s) by mouth once a day  -- Indication: For Supplement    Augmentin 875 mg-125 mg oral tablet  -- 1 tab(s) by mouth every 12 hours x 5 days  -- Indication: For Pneumonia    pantoprazole 40 mg oral delayed release tablet  -- 1 tab(s) by mouth once a day (before a meal)  -- Indication: For gerd    nicotine 10 mg inhalation device  -- 1  inhaled every 4 hours, As Needed  -- Indication: For Tobacco dependence    hydrALAZINE 50 mg oral tablet  -- 1 tab(s) by mouth every 8 hours  -- Indication: For Htn    ascorbic acid 500 mg oral tablet  -- 1 tab(s) by mouth once a day  -- Indication: For Supplement    cholecalciferol oral tablet  -- 2000 unit(s) by mouth once a day  -- Indication: For Supplement

## 2017-06-28 NOTE — PROGRESS NOTE ADULT - PROBLEM SELECTOR PROBLEM 5
Diabetes mellitus
Squamous cell carcinoma of tonsil
CARMEL (acute kidney injury)
Squamous cell carcinoma of tonsil

## 2017-06-28 NOTE — DISCHARGE NOTE ADULT - PLAN OF CARE
resolution of sob Patient refused tracheostomy and wife ,HCP requestsed limited med inteventions /palliative care -MOLST was completed prior to return to LTC continue current managmnet and medications ,increase water intake -free water flushes via GT ,repeat bmp upon return to SNF and followup with Dr Blackwell followup with wound care clinic in 1 week Continue topical care Accuchecks monitoring and insulin sliding scale coverage, no concentrated sweets diet, serial labs and f/up with PMD, monitor HB A 1 C every 3-4 mnth complete 5 days of augmnetin and tapering of decadrone x 5 days Followup with pmd and pulm cons nutr followup Gastrointestina stress ulcer prophylaxis l and DVT prophylaxis administrated continue current managmnet and medications ,increase water intake -free water flushes via GT ,repeat bmp  upon return to SNF and followup with Dr Blackwell

## 2017-06-28 NOTE — DISCHARGE NOTE ADULT - INSTRUCTIONS
CONTINUE GT FEEDINGS as per nutritionist recommendation and FREE WATER FLUSHES 200 ML Q 4 HRS VIA PEG

## 2017-06-28 NOTE — DISCHARGE NOTE ADULT - SECONDARY DIAGNOSIS.
Encephalopathy, unspecified Heel ulcer due to DM Diabetes mellitus Pneumonia due to infectious organism, unspecified laterality, unspecified part of lung Obesity Prophylactic measure

## 2017-06-28 NOTE — DISCHARGE NOTE ADULT - MEDICATION SUMMARY - MEDICATIONS TO CHANGE
I will SWITCH the dose or number of times a day I take the medications listed below when I get home from the hospital:    insulin glargine  -- 50 unit(s) subcutaneous once a day (at bedtime)    torsemide 20 mg oral tablet  -- 1 tab(s) by mouth once a day

## 2017-06-28 NOTE — DISCHARGE NOTE ADULT - ADDITIONAL INSTRUCTIONS
patient is being discharged with palliative managmnet and  limited medical interventions  as per wife request MOLST is completed by palliative care service DNR/ DNI ,no tracheostomy ,no invasive testing or treatment ,no aggressive managmnet Symptomatic management only Patient and wife would like him to return to LTC

## 2017-06-28 NOTE — PROGRESS NOTE ADULT - PROBLEM SELECTOR PROBLEM 6
Renal failure
Renal failure
CARMEL (acute kidney injury)
Renal failure
Renal failure
CARMEL (acute kidney injury)
Diabetes mellitus

## 2017-06-28 NOTE — DISCHARGE NOTE ADULT - CARE PLAN
Principal Discharge DX:	Acute respiratory failure  Goal:	resolution of sob  Instructions for follow-up, activity and diet:	Patient refused tracheostomy and wife ,HCP requestsed limited med inteventions /palliative care -MOLST was completed prior to return to LTC  Secondary Diagnosis:	Encephalopathy, unspecified  Instructions for follow-up, activity and diet:	continue current managmnet and medications ,increase water intake -free water flushes via GT ,repeat bmp upon return to SNF and followup with Dr Blackwell  Secondary Diagnosis:	Heel ulcer due to DM  Instructions for follow-up, activity and diet:	followup with wound care clinic in 1 week Continue topical care  Secondary Diagnosis:	Diabetes mellitus  Instructions for follow-up, activity and diet:	Accuchecks monitoring and insulin sliding scale coverage, no concentrated sweets diet, serial labs and f/up with PMD, monitor HB A 1 C every 3-4 mnth  Secondary Diagnosis:	Pneumonia due to infectious organism, unspecified laterality, unspecified part of lung  Instructions for follow-up, activity and diet:	complete 5 days of augmnetin and tapering of decadrone x 5 days Followup with pmd and pulm cons  Secondary Diagnosis:	Obesity  Instructions for follow-up, activity and diet:	nutr followup  Secondary Diagnosis:	Prophylactic measure  Instructions for follow-up, activity and diet:	Gastrointestina stress ulcer prophylaxis l and DVT prophylaxis administrated Principal Discharge DX:	Acute respiratory failure  Goal:	resolution of sob  Instructions for follow-up, activity and diet:	Patient refused tracheostomy and wife ,HCP requestsed limited med inteventions /palliative care -MOLST was completed prior to return to LTC  Secondary Diagnosis:	Encephalopathy, unspecified  Instructions for follow-up, activity and diet:	continue current managmnet and medications ,increase water intake -free water flushes via GT ,repeat bmp  upon return to SNF and followup with Dr Blackwell  Secondary Diagnosis:	Heel ulcer due to DM  Instructions for follow-up, activity and diet:	followup with wound care clinic in 1 week Continue topical care  Secondary Diagnosis:	Diabetes mellitus  Instructions for follow-up, activity and diet:	Accuchecks monitoring and insulin sliding scale coverage, no concentrated sweets diet, serial labs and f/up with PMD, monitor HB A 1 C every 3-4 mnth  Secondary Diagnosis:	Pneumonia due to infectious organism, unspecified laterality, unspecified part of lung  Instructions for follow-up, activity and diet:	complete 5 days of augmnetin and tapering of decadrone x 5 days Followup with pmd and pulm cons  Secondary Diagnosis:	Obesity  Instructions for follow-up, activity and diet:	nutr followup  Secondary Diagnosis:	Prophylactic measure  Instructions for follow-up, activity and diet:	Gastrointestina stress ulcer prophylaxis l and DVT prophylaxis administrated

## 2017-06-28 NOTE — PROGRESS NOTE ADULT - PROBLEM SELECTOR PROBLEM 1
Respiratory failure
Respiratory failure
CARMEL (acute kidney injury)
Pneumonia due to infectious organism, unspecified laterality, unspecified part of lung
Respiratory failure
Respiratory failure
Acute respiratory failure
Pneumonia due to infectious organism, unspecified laterality, unspecified part of lung

## 2017-06-30 DIAGNOSIS — I25.10 ATHEROSCLEROTIC HEART DISEASE OF NATIVE CORONARY ARTERY WITHOUT ANGINA PECTORIS: ICD-10-CM

## 2017-06-30 DIAGNOSIS — I12.9 HYPERTENSIVE CHRONIC KIDNEY DISEASE WITH STAGE 1 THROUGH STAGE 4 CHRONIC KIDNEY DISEASE, OR UNSPECIFIED CHRONIC KIDNEY DISEASE: ICD-10-CM

## 2017-06-30 DIAGNOSIS — E11.621 TYPE 2 DIABETES MELLITUS WITH FOOT ULCER: ICD-10-CM

## 2017-06-30 DIAGNOSIS — E11.69 TYPE 2 DIABETES MELLITUS WITH OTHER SPECIFIED COMPLICATION: ICD-10-CM

## 2017-06-30 DIAGNOSIS — L97.409 NON-PRESSURE CHRONIC ULCER OF UNSPECIFIED HEEL AND MIDFOOT WITH UNSPECIFIED SEVERITY: ICD-10-CM

## 2017-06-30 DIAGNOSIS — J96.01 ACUTE RESPIRATORY FAILURE WITH HYPOXIA: ICD-10-CM

## 2017-06-30 DIAGNOSIS — D63.1 ANEMIA IN CHRONIC KIDNEY DISEASE: ICD-10-CM

## 2017-06-30 DIAGNOSIS — Z79.4 LONG TERM (CURRENT) USE OF INSULIN: ICD-10-CM

## 2017-06-30 DIAGNOSIS — Z99.81 DEPENDENCE ON SUPPLEMENTAL OXYGEN: ICD-10-CM

## 2017-06-30 DIAGNOSIS — Z79.01 LONG TERM (CURRENT) USE OF ANTICOAGULANTS: ICD-10-CM

## 2017-06-30 DIAGNOSIS — N18.4 CHRONIC KIDNEY DISEASE, STAGE 4 (SEVERE): ICD-10-CM

## 2017-06-30 DIAGNOSIS — E11.65 TYPE 2 DIABETES MELLITUS WITH HYPERGLYCEMIA: ICD-10-CM

## 2017-06-30 DIAGNOSIS — Z86.718 PERSONAL HISTORY OF OTHER VENOUS THROMBOSIS AND EMBOLISM: ICD-10-CM

## 2017-06-30 DIAGNOSIS — M86.8X7 OTHER OSTEOMYELITIS, ANKLE AND FOOT: ICD-10-CM

## 2017-06-30 DIAGNOSIS — A41.9 SEPSIS, UNSPECIFIED ORGANISM: ICD-10-CM

## 2017-06-30 DIAGNOSIS — Z92.3 PERSONAL HISTORY OF IRRADIATION: ICD-10-CM

## 2017-06-30 DIAGNOSIS — G93.41 METABOLIC ENCEPHALOPATHY: ICD-10-CM

## 2017-06-30 DIAGNOSIS — C79.89 SECONDARY MALIGNANT NEOPLASM OF OTHER SPECIFIED SITES: ICD-10-CM

## 2017-06-30 DIAGNOSIS — J44.9 CHRONIC OBSTRUCTIVE PULMONARY DISEASE, UNSPECIFIED: ICD-10-CM

## 2017-06-30 DIAGNOSIS — E11.51 TYPE 2 DIABETES MELLITUS WITH DIABETIC PERIPHERAL ANGIOPATHY WITHOUT GANGRENE: ICD-10-CM

## 2017-06-30 DIAGNOSIS — E11.22 TYPE 2 DIABETES MELLITUS WITH DIABETIC CHRONIC KIDNEY DISEASE: ICD-10-CM

## 2017-06-30 DIAGNOSIS — E11.40 TYPE 2 DIABETES MELLITUS WITH DIABETIC NEUROPATHY, UNSPECIFIED: ICD-10-CM

## 2017-06-30 DIAGNOSIS — J69.0 PNEUMONITIS DUE TO INHALATION OF FOOD AND VOMIT: ICD-10-CM

## 2017-06-30 DIAGNOSIS — N17.0 ACUTE KIDNEY FAILURE WITH TUBULAR NECROSIS: ICD-10-CM

## 2017-06-30 DIAGNOSIS — E11.319 TYPE 2 DIABETES MELLITUS WITH UNSPECIFIED DIABETIC RETINOPATHY WITHOUT MACULAR EDEMA: ICD-10-CM

## 2017-06-30 DIAGNOSIS — G47.33 OBSTRUCTIVE SLEEP APNEA (ADULT) (PEDIATRIC): ICD-10-CM

## 2017-06-30 DIAGNOSIS — Z92.21 PERSONAL HISTORY OF ANTINEOPLASTIC CHEMOTHERAPY: ICD-10-CM

## 2017-06-30 DIAGNOSIS — Z51.5 ENCOUNTER FOR PALLIATIVE CARE: ICD-10-CM

## 2017-06-30 DIAGNOSIS — Z66 DO NOT RESUSCITATE: ICD-10-CM

## 2017-06-30 DIAGNOSIS — E87.5 HYPERKALEMIA: ICD-10-CM

## 2017-06-30 DIAGNOSIS — R41.0 DISORIENTATION, UNSPECIFIED: ICD-10-CM

## 2017-06-30 DIAGNOSIS — M54.5 LOW BACK PAIN: ICD-10-CM

## 2017-06-30 DIAGNOSIS — C09.9 MALIGNANT NEOPLASM OF TONSIL, UNSPECIFIED: ICD-10-CM

## 2017-06-30 DIAGNOSIS — E66.01 MORBID (SEVERE) OBESITY DUE TO EXCESS CALORIES: ICD-10-CM

## 2017-07-23 PROCEDURE — 93005 ELECTROCARDIOGRAM TRACING: CPT

## 2017-07-23 PROCEDURE — 96375 TX/PRO/DX INJ NEW DRUG ADDON: CPT

## 2017-07-23 PROCEDURE — 94660 CPAP INITIATION&MGMT: CPT

## 2017-07-23 PROCEDURE — 83735 ASSAY OF MAGNESIUM: CPT

## 2017-07-23 PROCEDURE — 99285 EMERGENCY DEPT VISIT HI MDM: CPT | Mod: 25

## 2017-07-23 PROCEDURE — 83036 HEMOGLOBIN GLYCOSYLATED A1C: CPT

## 2017-07-23 PROCEDURE — 84540 ASSAY OF URINE/UREA-N: CPT

## 2017-07-23 PROCEDURE — 82550 ASSAY OF CK (CPK): CPT

## 2017-07-23 PROCEDURE — 97162 PT EVAL MOD COMPLEX 30 MIN: CPT

## 2017-07-23 PROCEDURE — 94770: CPT

## 2017-07-23 PROCEDURE — 96372 THER/PROPH/DIAG INJ SC/IM: CPT | Mod: 59

## 2017-07-23 PROCEDURE — 70551 MRI BRAIN STEM W/O DYE: CPT

## 2017-07-23 PROCEDURE — 80048 BASIC METABOLIC PNL TOTAL CA: CPT

## 2017-07-23 PROCEDURE — 85730 THROMBOPLASTIN TIME PARTIAL: CPT

## 2017-07-23 PROCEDURE — 71045 X-RAY EXAM CHEST 1 VIEW: CPT

## 2017-07-23 PROCEDURE — 73630 X-RAY EXAM OF FOOT: CPT

## 2017-07-23 PROCEDURE — 87040 BLOOD CULTURE FOR BACTERIA: CPT

## 2017-07-23 PROCEDURE — 82140 ASSAY OF AMMONIA: CPT

## 2017-07-23 PROCEDURE — 84100 ASSAY OF PHOSPHORUS: CPT

## 2017-07-23 PROCEDURE — 84145 PROCALCITONIN (PCT): CPT

## 2017-07-23 PROCEDURE — 94640 AIRWAY INHALATION TREATMENT: CPT

## 2017-07-23 PROCEDURE — 96376 TX/PRO/DX INJ SAME DRUG ADON: CPT

## 2017-07-23 PROCEDURE — 82570 ASSAY OF URINE CREATININE: CPT

## 2017-07-23 PROCEDURE — 81001 URINALYSIS AUTO W/SCOPE: CPT

## 2017-07-23 PROCEDURE — 36600 WITHDRAWAL OF ARTERIAL BLOOD: CPT

## 2017-07-23 PROCEDURE — 36415 COLL VENOUS BLD VENIPUNCTURE: CPT

## 2017-07-23 PROCEDURE — 84300 ASSAY OF URINE SODIUM: CPT

## 2017-07-23 PROCEDURE — 96365 THER/PROPH/DIAG IV INF INIT: CPT

## 2017-07-23 PROCEDURE — 82803 BLOOD GASES ANY COMBINATION: CPT

## 2017-07-23 PROCEDURE — 85610 PROTHROMBIN TIME: CPT

## 2017-07-23 PROCEDURE — 94760 N-INVAS EAR/PLS OXIMETRY 1: CPT

## 2017-07-23 PROCEDURE — 87086 URINE CULTURE/COLONY COUNT: CPT

## 2017-07-23 PROCEDURE — 80053 COMPREHEN METABOLIC PANEL: CPT

## 2017-07-23 PROCEDURE — 85027 COMPLETE CBC AUTOMATED: CPT

## 2017-07-23 PROCEDURE — 83605 ASSAY OF LACTIC ACID: CPT

## 2017-07-23 PROCEDURE — 80202 ASSAY OF VANCOMYCIN: CPT

## 2017-07-31 ENCOUNTER — TRANSCRIPTION ENCOUNTER (OUTPATIENT)
Age: 57
End: 2017-07-31

## 2018-07-17 NOTE — H&P ADULT - CONSTITUTIONAL DETAILS
The following medication was given:     MEDICATION: RhoGam 300 ug  ROUTE: IM  SITE: [facility]  DOSE: 1 dose  LOT #: [id number]  :  [employer]  EXPIRATION DATE:  2019  NDC#: 3598-1891-69  Denise Thomason CMA        obese/respiratory distress

## 2018-11-02 NOTE — H&P ADULT - PROBLEM SELECTOR PROBLEM 5
[facility] Medicine Services  INPATIENT PROGRESS NOTE    Patient Name: Burke Reinoso Jr.  Date of Admission: 11/1/2018  Today's Date: 11/02/18  Length of Stay: 0  Primary Care Physician: Mg Colmenares MD    Subjective   Chief Complaint: left hip pain    HPI   The patient is currently laying in bed with family at the bedside.  He was just recently discharged from this facility to [facility] after having a left intertrochanteric hip fracture with trochanteric fixation nailing.  He apparently suffered a fall yesterday and there was deformity of the left leg noted.  He was brought here for further evaluation.  He now has a comminuted shaft fracture that is very significant.  Dr. Colon has been consulted. The patient is baseline confused.     Review of Systems   Constitutional: Positive for activity change and fatigue. Negative for appetite change, chills and fever.   HENT: Negative for hearing loss, nosebleeds, tinnitus and trouble swallowing.    Eyes: Negative for visual disturbance.   Respiratory: Negative for cough, chest tightness, shortness of breath and wheezing.    Cardiovascular: Negative for chest pain, palpitations and leg swelling.   Gastrointestinal: Negative for abdominal distention, abdominal pain, blood in stool, constipation, diarrhea, nausea and vomiting.   Endocrine: Negative for cold intolerance, heat intolerance, polydipsia, polyphagia and polyuria.   Genitourinary: Negative for decreased urine volume, difficulty urinating, dysuria, flank pain, frequency and hematuria.   Musculoskeletal: Positive for gait problem, joint swelling and myalgias. Negative for arthralgias.   Skin: Negative for rash.   Allergic/Immunologic: Negative for immunocompromised state.   Neurological: Positive for weakness. Negative for dizziness, syncope, light-headedness and headaches.   Hematological: Negative for adenopathy. Does not bruise/bleed easily.    Psychiatric/Behavioral: Positive for confusion. Negative for sleep disturbance. The patient is not nervous/anxious.       All pertinent negatives and positives are as above. All other systems have been reviewed and are negative unless otherwise stated.     Objective    Temp:  [97.1 °F (36.2 °C)-98.3 °F (36.8 °C)] 97.7 °F (36.5 °C)  Heart Rate:  [] 90  Resp:  [14-22] 16  BP: (118-152)/(49-88) 132/63     Physical Exam   Constitutional: He appears well-developed and well-nourished.   Laying in bed. NAD.   HENT:   Head: Normocephalic and atraumatic.   Eyes: Pupils are equal, round, and reactive to light. Conjunctivae and EOM are normal.   Neck: Neck supple. No JVD present. No thyromegaly present.   Cardiovascular: Normal rate, regular rhythm, normal heart sounds and intact distal pulses.  Exam reveals no gallop and no friction rub.    No murmur heard.  Pulmonary/Chest: Effort normal and breath sounds normal. No respiratory distress. He has no wheezes. He has no rales. He exhibits no tenderness.   Abdominal: Soft. Bowel sounds are normal. He exhibits no distension. There is no tenderness. There is no rebound and no guarding.   Genitourinary:   Genitourinary Comments: Singleton.    Musculoskeletal: Normal range of motion. He exhibits no edema, tenderness or deformity.   Bruising noted to left hip/groin   Lymphadenopathy:     He has no cervical adenopathy.   Neurological: He is alert. He is disoriented.   Skin: Skin is warm and dry. No rash noted.   Psychiatric: He has a normal mood and affect. His behavior is normal. Judgment and thought content normal.   Nursing note and vitals reviewed.    Results Review:  I have reviewed the labs, radiology results, and diagnostic studies.    Laboratory Data:     Results from last 7 days  Lab Units 11/02/18  0440 11/01/18  2337 10/30/18  0543  10/27/18  0641   WBC 10*3/mm3  --  14.26*  --   --  14.88*   HEMOGLOBIN g/dL 8.2* 8.7* 9.2*  < > 10.2*   HEMATOCRIT % 25.4* 26.1* 28.2*  < >  30.3*   PLATELETS 10*3/mm3  --  330  --   --  256   < > = values in this interval not displayed.       Results from last 7 days  Lab Units 11/02/18  0440 11/01/18  2337 10/27/18  0641   SODIUM mmol/L 135 135 135   POTASSIUM mmol/L 4.4 4.6 4.2   CHLORIDE mmol/L 94* 94* 99   CO2 mmol/L 30.0 31.0 28.0   BUN mg/dL 25* 26* 22*   CREATININE mg/dL 0.81 0.87 0.87   CALCIUM mg/dL 8.5 8.6 8.9   BILIRUBIN mg/dL  --  1.2* 0.6   ALK PHOS U/L  --  80 92   ALT (SGPT) U/L  --  37 21   AST (SGOT) U/L  --  47* 16   GLUCOSE mg/dL 110* 129* 121*     Radiology Data:   Imaging Results (last 24 hours)     Procedure Component Value Units Date/Time    XR Abdomen KUB [[id number]] Collected:  11/02/18 0815     Updated:  11/02/18 0822    Narrative:       EXAMINATION: KUB 11/20/2018     HISTORY: Belly pain and leukocytosis     FINDINGS: KUB radiograph demonstrates mild constipation with a  nonspecific bowel gas pattern. There is no obstruction or free air.  There is arthritic change of the right hip and previous fixation for a  left hip fracture. A Singleton catheter is in place.       Impression:       . Mild constipation. There is a nonspecific bowel gas  pattern. There is no obstruction or free air.  This report was finalized on 11/02/2018 08:19 by Dr. Harvey Landry MD.    XR Femur 2 View Left [[id number]] Collected:  11/02/18 0710     Updated:  11/02/18 0718    Narrative:       LEFT FEMUR, 2 VIEWS 11/2/2018 12:12 AM CDT     HISTORY: fall, pain; S72.352A-Displaced comminuted fracture of shaft of  left femur, initial encounter for closed fracture     COMPARISON: Intraoperative fluoroscopic images dated 10/27/2018     FINDINGS:     Frontal and lateral radiographs of the left femur were obtained.     TFN recently placed. New periprosthetic fracture with comminution of the  proximal diaphysis. Large displaced butterfly fragments are present and  there is foreshortening up to 7 cm. Distal femur appears intact.       Impression:       1. New  comminuted periprosthetic fracture along the proximal diaphysis  of femur.  This report was finalized on 11/02/2018 07:15 by Dr Babak Franklin, .    XR Chest 1 View [[id number]] Collected:  11/02/18 0657     Updated:  11/02/18 0701    Narrative:       EXAMINATION:   XR CHEST 1 VW-  11/2/2018 6:57 AM CDT     HISTORY: Preop     Frontal upright radiograph of the chest 11/2/2018 1:31 AM CDT     COMPARISON: October 27, 2018.     FINDINGS:   The lungs are clear. The cardiac silhouette is normal. Vascular  calcifications present aortic arch..      The osseous structures and surrounding soft tissues demonstrate no acute  abnormality.       Impression:       1. No radiographic evidence of acute cardiopulmonary process.        This report was finalized on 11/02/2018 06:57 by Dr. Donis Andrews MD.        I have reviewed the patient's current medications.     Assessment/Plan     Assessment:  1.  Acute traumatic comminuted periprosthetic fracture along the proximal diaphysis of the femur  2.  Frequent falls  3.  Recent left intertrochanteric hip fracture with trochanteric fixation nailing on 10/27/18  4.  Acute blood loss anemia from recent surgery   5.  Acutely anticoagulated with Coumadin for DVT prophylaxis secondary to #3  6.  Constipation, chronic  7.  Mild protein malnutrition   8.  Essential hypertension   9.  Leukocytosis, suspected reactive    Plan:  1.  NPO for surgery later today with Dr. Colon  2.  Vitamin K given to reverse Coumadin per Dr. Colon  3.  Transfusing 2 units PRBC  4.  Bowel regimen   5.  CBC and BMP in AM  6.  SCDs for DVT prophylaxis  7.  Nutrition consult  8.  PT/INR in AM  9.  Chest xray normal    Discharge Planning: I expect the patient to be discharged to [facility] in ? days.    Jacek Kinsey, ALKA   11/02/18   12:18 PM     Osteomyelitis of ankle or foot

## 2018-12-14 NOTE — H&P ADULT - PMH
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
Cellulitis with evidence of impetigo.  Given extensive hand swelling, extension over the wrist, will admit for monitoring of response to antibiotics.  Although keflex dosing was inappropriate, given impetiginous changes, will start clinda to cover for both MRSA and other typical skin renny.  Labs, bolus.  Will get EKG to evaluate for irregular heartbeat.  Chad Woods MD

## 2019-01-07 NOTE — DISCHARGE NOTE ADULT - NS AS DC PROVIDER CONTACT Y/N MULTI
Before Your Surgery      Call your surgeon if there is any change in your health. This includes signs of a cold or flu (such as a sore throat, runny nose, cough, rash or fever).    Do not smoke, drink alcohol or take over the counter medicine (unless your surgeon or primary care doctor tells you to) for the 24 hours before and after surgery.    If you take prescribed drugs: Follow your doctor s orders about which medicines to take and which to stop until after surgery.    Eating and drinking prior to surgery: follow the instructions from your surgeon    Take a shower or bath the night before surgery. Use the soap your surgeon gave you to gently clean your skin. If you do not have soap from your surgeon, use your regular soap. Do not shave or scrub the surgery site.  Wear clean pajamas and have clean sheets on your bed.   
Yes

## 2020-01-12 NOTE — PROGRESS NOTE ADULT - PROBLEM SELECTOR PLAN 6
Tamazight
serial bmp,iv fluids ,renal followup
serial bmp,iv fluids ,renal followup
on iv fluids
serial bmp,iv fluids ,renal followup
serial bmp,iv fluids ,renal followup
Uncontrolled  On lantus, ISS  Check FS q4h while on Decadron and cover with sliding scale  Consider insulin infusion if poor glycemic control persists   R foot ulcer wound care and follow recommendations per Podiatry
on iv fluids

## 2020-02-10 NOTE — PATIENT PROFILE ADULT. - AS SC BRADEN MOISTURE
Pt has a ough X 3 weeks with yellow phlegm. He tried mucinex cough but this does not help and he wants antibiotics over the phone. He has no fever, no wheezing or sob, energy level is normal. No chest pain or tightness.  Reason for Disposition  • [1] Continuous (nonstop) coughing interferes with work or school AND [2] no improvement using cough treatment per Care Advice    Protocols used: COUGH - ACUTE [id number]     (3) occasionally moist

## 2020-05-22 NOTE — PHYSICAL THERAPY INITIAL EVALUATION ADULT - DIAGNOSIS, PT EVAL
----- Message from Azucena Huston MD sent at 5/22/2020 12:01 PM CDT -----  plz call pt when you get chance and inform those labs are normal, still pending labs will update if abnormal   THX  
Patient called with below results. Verbalized understanding.   
decreased strength

## 2020-07-08 NOTE — PROGRESS NOTE ADULT - PROVIDER SPECIALTY LIST ADULT
Cardiology
Critical Care
Hospitalist
Infectious Disease
Nephrology
Neurology
Podiatry
Podiatry
Psychiatry
Pulmonology
Cardiology
Critical Care
Podiatry
Patent
Hospitalist

## 2020-07-28 NOTE — H&P ADULT - PROBLEM SELECTOR PROBLEM 2
- R groin wound vac in place from prior ECMO site Pneumonia due to infectious organism, unspecified laterality, unspecified part of lung

## 2021-01-18 NOTE — PROGRESS NOTE ADULT - PROBLEM/PLAN-7
Detail Level: Zone
DISPLAY PLAN FREE TEXT

## 2022-09-21 NOTE — PROGRESS NOTE ADULT - SUBJECTIVE AND OBJECTIVE BOX
Patient seen and examined today Plan discussed/Questions answered  (with patient/ancillary staff/colleagues) Chart notes reviewd More detailed Pulm/Critical Care notes, assessment and plan  will be added later today as needed after reviewing further labs as they become available     Notable interval events reviewed ENT saw pt AP was as follows Upper airway obstruction, diabetes, dementia, hx of tonsil cancer dysphagia. After review of records there is no definitive cause for dementia and no evidence of metastatic disease or recurrence. Significant selling may be secondary to radiation vs tumor. Would need PET scan and or MRI to define underlying tumor status. To perform such tests would need to be off cpap. Patient requires a tracheostomy and a direct laryngoscopy and exam under anesthesia to define etiology of obstruction. Needs MRI of brain to r/o metastatic disease vs cva or diabetic encephalopathy or secondary to renal failure.       ROS/PE  . REVIEW OF SYSTEMS Patient is unable to give any reliable review of systems   PHYSICAL EXAM  neck swelling  HEENT Unremarkable PERRLA atraumatic  RESP Fair air entry EXP prolonged    Harsh breath sound Resp distres mild  CARDIAC S1 S2 No S3     NO JVD   Awake Alert and ORIENTED x on  ABDOMEN SOFT BS PRESENT NOT DISTENDED No hepatosplenomegaly  PEDAL EDEMA present No calf tenderness  NO rash GENERAL Not TOXIC looking  . Patient seen and examined today Plan discussed/Questions answered  (with patient/ancillary staff/colleagues) Chart notes reviewd More detailed Pulm/Critical Care notes, assessment and plan  will be added later today as needed after reviewing further labs as they become available     Notable interval events reviewed ENT saw pt AP was as follows Upper airway obstruction, diabetes, dementia, hx of tonsil cancer dysphagia. After review of records there is no definitive cause for dementia and no evidence of metastatic disease or recurrence. Significant selling may be secondary to radiation vs tumor. Would need PET scan and or MRI to define underlying tumor status. To perform such tests would need to be off cpap. Patient requires a tracheostomy and a direct laryngoscopy and exam under anesthesia to define etiology of obstruction. Needs MRI of brain to r/o metastatic disease vs cva or diabetic encephalopathy or secondary to renal failure.       ROS/PE  . REVIEW OF SYSTEMS Patient is unable to give any reliable review of systems   PHYSICAL EXAM  neck swelling  HEENT Unremarkable PERRLA atraumatic  RESP Fair air entry EXP prolonged    Harsh breath sound Resp distres mild  CARDIAC S1 S2 No S3     NO JVD   Awake Alert and ORIENTED x on  ABDOMEN SOFT BS PRESENT NOT DISTENDED No hepatosplenomegaly  PEDAL EDEMA present No calf tenderness  NO rash GENERAL Not TOXIC looking  . 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  JYOTI (on CPAP  14), PVD smoker who presented [facility] with resp distress and fever T 101 In ER noted to have insp stridor ABG on 100% (9a) 734/54/92 and was admitted to [facility] 6/23/2017  VITALS/LABS  6/24/2017 afeb 73 140/70 28   6/24/2017 W 17.2 Hb 7.3 Plt 196  Na 145 K 4.4 CO2 32 Cr 3.1   PROBLEM ASSESSMENT PLAN   UAO  6/23 Seen by ENT HO SCC R tonsil sp RT (not told well) sp Chemo last 10/2016) Need trach which he has declined   On decadron 4.2 (6/24)   RESP   6/24 6a BPAP 16/8/14/.4 736/52/142   Monitor PO ABG Target PO 90-96%   6/23/2017 ABG on 100% (9a) 734/54/92  ASPIRATION PNEUMONIA   6/23-6/24/2017 W 24.5-17.2   6/24 pct 1.56 6/23 bc n 6/23 uc n   6/23 LA 1.6-1.1  6/22/2017 CXR lll infiltr  zosyn (6/23)   VANCO LEVEL MONITORING IN RENAL PATIENT   6/24 Vanco 7.8   COPD   albuterol hfa.4p (6/23)   CAD  ASA 81 (6/23) metoprolol 100.2 (6/23)   CARMEL   8/9/2016-6/23-6/24/2017 Cr 3.5-3.9-3.1    6/23 Susan Below 20   1/2 NS 75 (6/24)   ANEMIA LIKELY SEC CKD   8/9/2016-6/23-6/24/2017  Hb 8.1-8.5-7.3  OBTUNDATION   mr being planned for 6/26   GLOBAL ISSUE/BEST PRACTICE:        PROBLEM:      Analgesia:     na                        PROBLEM: Sedation:     na               PROBLEM: HOB elevation:   yes             PROBLEM: Stress ulcer proph:    protonix 40 (6/24)                       PROBLEM: VTE prophylaxis:      hsc (6/23)                        PROBLEM: Glycemic control:    iss (6/23)             PROBLEM: Nutrition:    Glucerna 1.2 1680 (6/24)                       PROBLEM: Advanced directive: dnr     PROBLEM: Allergies:  na  TIME SPENT Over 25 minutes aggregate time spent on encounter; activities included   direct patient care, counseling and/or coordinating care reviewing notes, lab data/ imaging , discussion with multidisciplinary team/ patient  /family. Risks, benefits, alternatives  discussed in detail. Questions/concerns  were addressed  to the best of my ability . school/Current or pending social isolation/Inadequate social supports

## 2025-03-25 NOTE — INPATIENT CERTIFICATION FOR MEDICARE PATIENTS - PHYSICIAN CONCUR
Refill request  
I concur with the Admission Order and I certify that services are provided in accordance with Section 42 CFR § 412.3